# Patient Record
Sex: MALE | Race: OTHER | NOT HISPANIC OR LATINO | Employment: UNEMPLOYED | URBAN - METROPOLITAN AREA
[De-identification: names, ages, dates, MRNs, and addresses within clinical notes are randomized per-mention and may not be internally consistent; named-entity substitution may affect disease eponyms.]

---

## 2023-08-01 ENCOUNTER — HOSPITAL ENCOUNTER (INPATIENT)
Facility: HOSPITAL | Age: 68
LOS: 3 days | Discharge: HOME | DRG: 194 | End: 2023-08-05
Attending: EMERGENCY MEDICINE | Admitting: INTERNAL MEDICINE
Payer: COMMERCIAL

## 2023-08-01 ENCOUNTER — APPOINTMENT (EMERGENCY)
Dept: CT IMAGING | Facility: HOSPITAL | Age: 68
DRG: 194 | End: 2023-08-01
Payer: COMMERCIAL

## 2023-08-01 DIAGNOSIS — I50.43 ACUTE ON CHRONIC COMBINED SYSTOLIC AND DIASTOLIC CONGESTIVE HEART FAILURE (HCC): ICD-10-CM

## 2023-08-01 DIAGNOSIS — R09.02 HYPOXIA: ICD-10-CM

## 2023-08-01 DIAGNOSIS — I50.9 CHF EXACERBATION (HCC): Primary | ICD-10-CM

## 2023-08-01 DIAGNOSIS — J90 PLEURAL EFFUSION: ICD-10-CM

## 2023-08-01 DIAGNOSIS — I48.92 ATRIAL FLUTTER (HCC): ICD-10-CM

## 2023-08-01 PROBLEM — R06.02 SOB (SHORTNESS OF BREATH): Status: ACTIVE | Noted: 2020-08-20

## 2023-08-01 PROBLEM — I47.29 NSVT (NONSUSTAINED VENTRICULAR TACHYCARDIA) (HCC): Status: ACTIVE | Noted: 2020-08-21

## 2023-08-01 PROBLEM — I50.20 HFREF (HEART FAILURE WITH REDUCED EJECTION FRACTION) (HCC): Status: ACTIVE | Noted: 2021-07-16

## 2023-08-01 PROBLEM — I50.22 CHRONIC SYSTOLIC CHF (CONGESTIVE HEART FAILURE) (HCC): Status: ACTIVE | Noted: 2023-06-07

## 2023-08-01 PROBLEM — Z59.89 UNINSURED: Status: ACTIVE | Noted: 2020-09-09

## 2023-08-01 PROBLEM — R05.9 COUGH: Status: ACTIVE | Noted: 2020-10-19

## 2023-08-01 PROBLEM — I25.5 CARDIOMYOPATHY, ISCHEMIC: Status: ACTIVE | Noted: 2023-06-07

## 2023-08-01 LAB
2HR DELTA HS TROPONIN: 0 NG/L
ALBUMIN SERPL BCP-MCNC: 3.7 G/DL (ref 3.5–5)
ALP SERPL-CCNC: 83 U/L (ref 34–104)
ALT SERPL W P-5'-P-CCNC: 31 U/L (ref 7–52)
ANION GAP SERPL CALCULATED.3IONS-SCNC: 6 MMOL/L
APTT PPP: 51 SECONDS (ref 23–37)
AST SERPL W P-5'-P-CCNC: 37 U/L (ref 13–39)
ATRIAL RATE: 337 BPM
ATRIAL RATE: 381 BPM
BASOPHILS # BLD AUTO: 0.08 THOUSANDS/ÂΜL (ref 0–0.1)
BASOPHILS NFR BLD AUTO: 1 % (ref 0–1)
BILIRUB SERPL-MCNC: 1.24 MG/DL (ref 0.2–1)
BNP SERPL-MCNC: 1244 PG/ML (ref 0–100)
BUN SERPL-MCNC: 24 MG/DL (ref 5–25)
CALCIUM SERPL-MCNC: 8.7 MG/DL (ref 8.4–10.2)
CARDIAC TROPONIN I PNL SERPL HS: 25 NG/L
CARDIAC TROPONIN I PNL SERPL HS: 25 NG/L
CHLORIDE SERPL-SCNC: 103 MMOL/L (ref 96–108)
CO2 SERPL-SCNC: 26 MMOL/L (ref 21–32)
CREAT SERPL-MCNC: 1.47 MG/DL (ref 0.6–1.3)
EOSINOPHIL # BLD AUTO: 0.12 THOUSAND/ÂΜL (ref 0–0.61)
EOSINOPHIL NFR BLD AUTO: 2 % (ref 0–6)
ERYTHROCYTE [DISTWIDTH] IN BLOOD BY AUTOMATED COUNT: 14.6 % (ref 11.6–15.1)
GFR SERPL CREATININE-BSD FRML MDRD: 48 ML/MIN/1.73SQ M
GLUCOSE SERPL-MCNC: 140 MG/DL (ref 65–140)
HCT VFR BLD AUTO: 42.3 % (ref 36.5–49.3)
HGB BLD-MCNC: 13 G/DL (ref 12–17)
IMM GRANULOCYTES # BLD AUTO: 0.01 THOUSAND/UL (ref 0–0.2)
IMM GRANULOCYTES NFR BLD AUTO: 0 % (ref 0–2)
INR PPP: 1.19 (ref 0.84–1.19)
LIPASE SERPL-CCNC: 22 U/L (ref 11–82)
LYMPHOCYTES # BLD AUTO: 2.58 THOUSANDS/ÂΜL (ref 0.6–4.47)
LYMPHOCYTES NFR BLD AUTO: 40 % (ref 14–44)
MCH RBC QN AUTO: 29.6 PG (ref 26.8–34.3)
MCHC RBC AUTO-ENTMCNC: 30.7 G/DL (ref 31.4–37.4)
MCV RBC AUTO: 96 FL (ref 82–98)
MONOCYTES # BLD AUTO: 1 THOUSAND/ÂΜL (ref 0.17–1.22)
MONOCYTES NFR BLD AUTO: 15 % (ref 4–12)
NEUTROPHILS # BLD AUTO: 2.71 THOUSANDS/ÂΜL (ref 1.85–7.62)
NEUTS SEG NFR BLD AUTO: 42 % (ref 43–75)
NRBC BLD AUTO-RTO: 0 /100 WBCS
PLATELET # BLD AUTO: 166 THOUSANDS/UL (ref 149–390)
PMV BLD AUTO: 12.9 FL (ref 8.9–12.7)
POTASSIUM SERPL-SCNC: 4.2 MMOL/L (ref 3.5–5.3)
PROT SERPL-MCNC: 6.6 G/DL (ref 6.4–8.4)
PROTHROMBIN TIME: 15.1 SECONDS (ref 11.6–14.5)
QRS AXIS: 93 DEGREES
QRS AXIS: 98 DEGREES
QRSD INTERVAL: 110 MS
QRSD INTERVAL: 116 MS
QT INTERVAL: 296 MS
QT INTERVAL: 398 MS
QTC INTERVAL: 404 MS
QTC INTERVAL: 513 MS
RBC # BLD AUTO: 4.39 MILLION/UL (ref 3.88–5.62)
SODIUM SERPL-SCNC: 135 MMOL/L (ref 135–147)
T WAVE AXIS: -87 DEGREES
T WAVE AXIS: 268 DEGREES
TSH SERPL DL<=0.05 MIU/L-ACNC: 3.92 UIU/ML (ref 0.45–4.5)
VENTRICULAR RATE: 100 BPM
VENTRICULAR RATE: 112 BPM
WBC # BLD AUTO: 6.5 THOUSAND/UL (ref 4.31–10.16)

## 2023-08-01 PROCEDURE — 85730 THROMBOPLASTIN TIME PARTIAL: CPT | Performed by: PHYSICIAN ASSISTANT

## 2023-08-01 PROCEDURE — 93010 ELECTROCARDIOGRAM REPORT: CPT | Performed by: STUDENT IN AN ORGANIZED HEALTH CARE EDUCATION/TRAINING PROGRAM

## 2023-08-01 PROCEDURE — G1004 CDSM NDSC: HCPCS

## 2023-08-01 PROCEDURE — 84443 ASSAY THYROID STIM HORMONE: CPT | Performed by: PHYSICIAN ASSISTANT

## 2023-08-01 PROCEDURE — 71275 CT ANGIOGRAPHY CHEST: CPT

## 2023-08-01 PROCEDURE — 85610 PROTHROMBIN TIME: CPT | Performed by: PHYSICIAN ASSISTANT

## 2023-08-01 PROCEDURE — 99285 EMERGENCY DEPT VISIT HI MDM: CPT

## 2023-08-01 PROCEDURE — 74174 CTA ABD&PLVS W/CONTRAST: CPT

## 2023-08-01 PROCEDURE — 83690 ASSAY OF LIPASE: CPT | Performed by: PHYSICIAN ASSISTANT

## 2023-08-01 PROCEDURE — 36415 COLL VENOUS BLD VENIPUNCTURE: CPT | Performed by: PHYSICIAN ASSISTANT

## 2023-08-01 PROCEDURE — 99285 EMERGENCY DEPT VISIT HI MDM: CPT | Performed by: PHYSICIAN ASSISTANT

## 2023-08-01 PROCEDURE — 93005 ELECTROCARDIOGRAM TRACING: CPT

## 2023-08-01 PROCEDURE — 96374 THER/PROPH/DIAG INJ IV PUSH: CPT

## 2023-08-01 PROCEDURE — 85025 COMPLETE CBC W/AUTO DIFF WBC: CPT | Performed by: PHYSICIAN ASSISTANT

## 2023-08-01 PROCEDURE — 84484 ASSAY OF TROPONIN QUANT: CPT | Performed by: PHYSICIAN ASSISTANT

## 2023-08-01 PROCEDURE — 96375 TX/PRO/DX INJ NEW DRUG ADDON: CPT

## 2023-08-01 PROCEDURE — 80053 COMPREHEN METABOLIC PANEL: CPT | Performed by: PHYSICIAN ASSISTANT

## 2023-08-01 PROCEDURE — 83880 ASSAY OF NATRIURETIC PEPTIDE: CPT | Performed by: PHYSICIAN ASSISTANT

## 2023-08-01 RX ORDER — METOPROLOL TARTRATE 5 MG/5ML
5 INJECTION INTRAVENOUS ONCE
Status: COMPLETED | OUTPATIENT
Start: 2023-08-01 | End: 2023-08-01

## 2023-08-01 RX ORDER — FUROSEMIDE 10 MG/ML
80 INJECTION INTRAMUSCULAR; INTRAVENOUS ONCE
Status: COMPLETED | OUTPATIENT
Start: 2023-08-01 | End: 2023-08-01

## 2023-08-01 RX ORDER — LOSARTAN POTASSIUM 50 MG/1
50 TABLET ORAL DAILY
COMMUNITY
Start: 2023-07-25 | End: 2023-08-05

## 2023-08-01 RX ORDER — ASPIRIN 81 MG/1
81 TABLET ORAL DAILY
COMMUNITY
Start: 2023-03-15

## 2023-08-01 RX ORDER — MORPHINE SULFATE 4 MG/ML
4 INJECTION, SOLUTION INTRAMUSCULAR; INTRAVENOUS ONCE
Status: COMPLETED | OUTPATIENT
Start: 2023-08-01 | End: 2023-08-01

## 2023-08-01 RX ORDER — FUROSEMIDE 40 MG/1
40 TABLET ORAL DAILY
COMMUNITY
Start: 2023-03-15 | End: 2024-03-14

## 2023-08-01 RX ORDER — ATORVASTATIN CALCIUM 40 MG/1
40 TABLET, FILM COATED ORAL DAILY
COMMUNITY
Start: 2023-03-15 | End: 2024-03-14

## 2023-08-01 RX ORDER — METOPROLOL SUCCINATE 50 MG/1
50 TABLET, EXTENDED RELEASE ORAL 2 TIMES DAILY
COMMUNITY
Start: 2023-03-30 | End: 2024-03-24

## 2023-08-01 RX ORDER — FENTANYL CITRATE 50 UG/ML
1 INJECTION, SOLUTION INTRAMUSCULAR; INTRAVENOUS ONCE
Status: COMPLETED | OUTPATIENT
Start: 2023-08-01 | End: 2023-08-01

## 2023-08-01 RX ADMIN — METOROPROLOL TARTRATE 5 MG: 5 INJECTION, SOLUTION INTRAVENOUS at 20:32

## 2023-08-01 RX ADMIN — FUROSEMIDE 80 MG: 10 INJECTION, SOLUTION INTRAVENOUS at 22:30

## 2023-08-01 RX ADMIN — IOHEXOL 100 ML: 350 INJECTION, SOLUTION INTRAVENOUS at 21:50

## 2023-08-01 RX ADMIN — MORPHINE SULFATE 4 MG: 4 INJECTION INTRAVENOUS at 20:31

## 2023-08-02 ENCOUNTER — APPOINTMENT (INPATIENT)
Dept: NON INVASIVE DIAGNOSTICS | Facility: HOSPITAL | Age: 68
DRG: 194 | End: 2023-08-02
Payer: COMMERCIAL

## 2023-08-02 PROBLEM — R05.9 COUGH: Status: RESOLVED | Noted: 2020-10-19 | Resolved: 2023-08-02

## 2023-08-02 PROBLEM — R07.9 CHEST PAIN: Status: ACTIVE | Noted: 2023-08-02

## 2023-08-02 PROBLEM — R06.02 SOB (SHORTNESS OF BREATH): Status: RESOLVED | Noted: 2020-08-20 | Resolved: 2023-08-02

## 2023-08-02 PROBLEM — I47.29 NSVT (NONSUSTAINED VENTRICULAR TACHYCARDIA) (HCC): Status: RESOLVED | Noted: 2020-08-21 | Resolved: 2023-08-02

## 2023-08-02 PROBLEM — I50.20 HFREF (HEART FAILURE WITH REDUCED EJECTION FRACTION) (HCC): Status: RESOLVED | Noted: 2021-07-16 | Resolved: 2023-08-02

## 2023-08-02 PROBLEM — R73.03 PREDIABETES: Status: ACTIVE | Noted: 2023-08-02

## 2023-08-02 PROBLEM — J90 PLEURAL EFFUSION, BILATERAL: Status: ACTIVE | Noted: 2023-08-02

## 2023-08-02 PROBLEM — I50.22 CHRONIC SYSTOLIC CHF (CONGESTIVE HEART FAILURE) (HCC): Status: RESOLVED | Noted: 2023-06-07 | Resolved: 2023-08-02

## 2023-08-02 LAB
ANION GAP SERPL CALCULATED.3IONS-SCNC: 8 MMOL/L
AORTIC ROOT: 2.1 CM
AORTIC VALVE MEAN VELOCITY: 21.7 M/S
APICAL FOUR CHAMBER EJECTION FRACTION: 27 %
AV AREA BY CONTINUOUS VTI: 1 CM2
AV AREA PEAK VELOCITY: 1.2 CM2
AV LVOT MEAN GRADIENT: 2 MMHG
AV LVOT PEAK GRADIENT: 3 MMHG
AV MEAN GRADIENT: 21 MMHG
AV PEAK GRADIENT: 36 MMHG
AV REGURGITATION PRESSURE HALF TIME: 687 MS
AV VALVE AREA: 1.05 CM2
AV VELOCITY RATIO: 0.31
BASOPHILS # BLD AUTO: 0.06 THOUSANDS/ÂΜL (ref 0–0.1)
BASOPHILS NFR BLD AUTO: 1 % (ref 0–1)
BUN SERPL-MCNC: 25 MG/DL (ref 5–25)
CALCIUM SERPL-MCNC: 9.1 MG/DL (ref 8.4–10.2)
CHLORIDE SERPL-SCNC: 102 MMOL/L (ref 96–108)
CHOLEST SERPL-MCNC: 94 MG/DL
CO2 SERPL-SCNC: 29 MMOL/L (ref 21–32)
CREAT SERPL-MCNC: 1.4 MG/DL (ref 0.6–1.3)
DOP CALC AO PEAK VEL: 2.98 M/S
DOP CALC AO VTI: 59.43 CM
DOP CALC LVOT AREA: 3.8 CM2
DOP CALC LVOT CARDIAC INDEX: 2.41 L/MIN/M2
DOP CALC LVOT CARDIAC OUTPUT: 4.17 L/MIN
DOP CALC LVOT DIAMETER: 2.2 CM
DOP CALC LVOT PEAK VEL VTI: 16.39 CM
DOP CALC LVOT PEAK VEL: 0.93 M/S
DOP CALC LVOT STROKE VOLUME: 62.27 CM3
E WAVE DECELERATION TIME: 126 MS
EOSINOPHIL # BLD AUTO: 0.02 THOUSAND/ÂΜL (ref 0–0.61)
EOSINOPHIL NFR BLD AUTO: 0 % (ref 0–6)
ERYTHROCYTE [DISTWIDTH] IN BLOOD BY AUTOMATED COUNT: 14.5 % (ref 11.6–15.1)
FRACTIONAL SHORTENING: 13 % (ref 28–44)
GFR SERPL CREATININE-BSD FRML MDRD: 51 ML/MIN/1.73SQ M
GLUCOSE P FAST SERPL-MCNC: 122 MG/DL (ref 65–99)
GLUCOSE SERPL-MCNC: 122 MG/DL (ref 65–140)
HCT VFR BLD AUTO: 41.9 % (ref 36.5–49.3)
HDLC SERPL-MCNC: 27 MG/DL
HGB BLD-MCNC: 13.1 G/DL (ref 12–17)
IMM GRANULOCYTES # BLD AUTO: 0.02 THOUSAND/UL (ref 0–0.2)
IMM GRANULOCYTES NFR BLD AUTO: 0 % (ref 0–2)
INTERVENTRICULAR SEPTUM IN DIASTOLE (PARASTERNAL SHORT AXIS VIEW): 1.3 CM
INTERVENTRICULAR SEPTUM: 1.3 CM (ref 0.6–1.1)
LAAS-AP2: 27.9 CM2
LAAS-AP4: 27.4 CM2
LDLC SERPL CALC-MCNC: 56 MG/DL (ref 0–100)
LEFT ATRIUM AREA SYSTOLE SINGLE PLANE A4C: 27.7 CM2
LEFT ATRIUM SIZE: 5.1 CM
LEFT ATRIUM VOLUME (MOD BIPLANE): 95 ML
LEFT INTERNAL DIMENSION IN SYSTOLE: 5.2 CM (ref 2.1–4)
LEFT VENTRICULAR INTERNAL DIMENSION IN DIASTOLE: 6 CM (ref 3.5–6)
LEFT VENTRICULAR POSTERIOR WALL IN END DIASTOLE: 1.3 CM
LEFT VENTRICULAR STROKE VOLUME: 55 ML
LVSV (TEICH): 55 ML
LYMPHOCYTES # BLD AUTO: 0.86 THOUSANDS/ÂΜL (ref 0.6–4.47)
LYMPHOCYTES NFR BLD AUTO: 16 % (ref 14–44)
MAGNESIUM SERPL-MCNC: 2.5 MG/DL (ref 1.9–2.7)
MCH RBC QN AUTO: 29.7 PG (ref 26.8–34.3)
MCHC RBC AUTO-ENTMCNC: 31.3 G/DL (ref 31.4–37.4)
MCV RBC AUTO: 95 FL (ref 82–98)
MITRAL REGURGITATION PEAK VELOCITY: 5.03 M/S
MITRAL VALVE MEAN INFLOW VELOCITY: 4.11 M/S
MITRAL VALVE REGURGITANT PEAK GRADIENT: 101 MMHG
MONOCYTES # BLD AUTO: 0.66 THOUSAND/ÂΜL (ref 0.17–1.22)
MONOCYTES NFR BLD AUTO: 12 % (ref 4–12)
MV E'TISSUE VEL-SEP: 4 CM/S
MV PEAK A VEL: 0.46 M/S
MV PEAK E VEL: 122 CM/S
MV STENOSIS PRESSURE HALF TIME: 37 MS
MV VALVE AREA P 1/2 METHOD: 5.95 CM2
NEUTROPHILS # BLD AUTO: 3.92 THOUSANDS/ÂΜL (ref 1.85–7.62)
NEUTS SEG NFR BLD AUTO: 71 % (ref 43–75)
NRBC BLD AUTO-RTO: 0 /100 WBCS
PLATELET # BLD AUTO: 149 THOUSANDS/UL (ref 149–390)
PMV BLD AUTO: 12.8 FL (ref 8.9–12.7)
POTASSIUM SERPL-SCNC: 4.4 MMOL/L (ref 3.5–5.3)
RA PRESSURE ESTIMATED: 15 MMHG
RBC # BLD AUTO: 4.41 MILLION/UL (ref 3.88–5.62)
RIGHT ATRIUM AREA SYSTOLE A4C: 18.6 CM2
RIGHT VENTRICLE ID DIMENSION: 4 CM
RV PSP: 64 MMHG
SL CV AV DECELERATION TIME RETROGRADE: 2370 MS
SL CV AV PEAK GRADIENT RETROGRADE: 69 MMHG
SL CV DOP CALC MV VTI RETROGRADE: 162.9 CM
SL CV LEFT ATRIUM LENGTH A2C: 6.7 CM
SL CV LV EF: 25
SL CV MV MEAN GRADIENT RETROGRADE: 72 MMHG
SL CV PED ECHO LEFT VENTRICLE DIASTOLIC VOLUME (MOD BIPLANE) 2D: 183 ML
SL CV PED ECHO LEFT VENTRICLE SYSTOLIC VOLUME (MOD BIPLANE) 2D: 128 ML
SODIUM SERPL-SCNC: 139 MMOL/L (ref 135–147)
TR MAX PG: 49 MMHG
TR PEAK VELOCITY: 3.5 M/S
TRICUSPID ANNULAR PLANE SYSTOLIC EXCURSION: 1.6 CM
TRICUSPID VALVE PEAK REGURGITATION VELOCITY: 3.5 M/S
TRIGL SERPL-MCNC: 54 MG/DL
WBC # BLD AUTO: 5.54 THOUSAND/UL (ref 4.31–10.16)

## 2023-08-02 PROCEDURE — 80048 BASIC METABOLIC PNL TOTAL CA: CPT

## 2023-08-02 PROCEDURE — 83735 ASSAY OF MAGNESIUM: CPT

## 2023-08-02 PROCEDURE — 93306 TTE W/DOPPLER COMPLETE: CPT

## 2023-08-02 PROCEDURE — 80061 LIPID PANEL: CPT

## 2023-08-02 PROCEDURE — 99223 1ST HOSP IP/OBS HIGH 75: CPT

## 2023-08-02 PROCEDURE — 85025 COMPLETE CBC W/AUTO DIFF WBC: CPT

## 2023-08-02 RX ORDER — FUROSEMIDE 10 MG/ML
50 INJECTION INTRAMUSCULAR; INTRAVENOUS 2 TIMES DAILY
Status: DISCONTINUED | OUTPATIENT
Start: 2023-08-02 | End: 2023-08-03

## 2023-08-02 RX ORDER — LOSARTAN POTASSIUM 50 MG/1
50 TABLET ORAL DAILY
Status: DISCONTINUED | OUTPATIENT
Start: 2023-08-02 | End: 2023-08-02

## 2023-08-02 RX ORDER — ATORVASTATIN CALCIUM 40 MG/1
40 TABLET, FILM COATED ORAL
Status: DISCONTINUED | OUTPATIENT
Start: 2023-08-02 | End: 2023-08-05 | Stop reason: HOSPADM

## 2023-08-02 RX ORDER — ACETAMINOPHEN 325 MG/1
650 TABLET ORAL EVERY 4 HOURS PRN
Status: DISCONTINUED | OUTPATIENT
Start: 2023-08-02 | End: 2023-08-05 | Stop reason: HOSPADM

## 2023-08-02 RX ORDER — MAGNESIUM HYDROXIDE/ALUMINUM HYDROXICE/SIMETHICONE 120; 1200; 1200 MG/30ML; MG/30ML; MG/30ML
30 SUSPENSION ORAL EVERY 6 HOURS PRN
Status: DISCONTINUED | OUTPATIENT
Start: 2023-08-02 | End: 2023-08-05 | Stop reason: HOSPADM

## 2023-08-02 RX ORDER — POLYETHYLENE GLYCOL 3350 17 G/17G
17 POWDER, FOR SOLUTION ORAL DAILY
Status: DISCONTINUED | OUTPATIENT
Start: 2023-08-02 | End: 2023-08-05 | Stop reason: HOSPADM

## 2023-08-02 RX ORDER — LOSARTAN POTASSIUM 25 MG/1
25 TABLET ORAL DAILY
Status: DISCONTINUED | OUTPATIENT
Start: 2023-08-03 | End: 2023-08-03

## 2023-08-02 RX ORDER — METOPROLOL SUCCINATE 50 MG/1
50 TABLET, EXTENDED RELEASE ORAL 2 TIMES DAILY
Status: DISCONTINUED | OUTPATIENT
Start: 2023-08-02 | End: 2023-08-03

## 2023-08-02 RX ORDER — ONDANSETRON 2 MG/ML
4 INJECTION INTRAMUSCULAR; INTRAVENOUS EVERY 6 HOURS PRN
Status: DISCONTINUED | OUTPATIENT
Start: 2023-08-02 | End: 2023-08-02

## 2023-08-02 RX ORDER — ONDANSETRON 2 MG/ML
4 INJECTION INTRAMUSCULAR; INTRAVENOUS EVERY 4 HOURS PRN
Status: DISCONTINUED | OUTPATIENT
Start: 2023-08-02 | End: 2023-08-05 | Stop reason: HOSPADM

## 2023-08-02 RX ADMIN — ASPIRIN 81 MG: 81 TABLET, COATED ORAL at 09:35

## 2023-08-02 RX ADMIN — APIXABAN 5 MG: 5 TABLET, FILM COATED ORAL at 17:29

## 2023-08-02 RX ADMIN — FUROSEMIDE 50 MG: 10 INJECTION, SOLUTION INTRAVENOUS at 09:33

## 2023-08-02 RX ADMIN — APIXABAN 5 MG: 5 TABLET, FILM COATED ORAL at 09:35

## 2023-08-02 RX ADMIN — POLYETHYLENE GLYCOL 3350 17 G: 17 POWDER, FOR SOLUTION ORAL at 09:37

## 2023-08-02 RX ADMIN — METOPROLOL SUCCINATE 50 MG: 50 TABLET, EXTENDED RELEASE ORAL at 09:40

## 2023-08-02 RX ADMIN — ATORVASTATIN CALCIUM 40 MG: 80 TABLET, FILM COATED ORAL at 17:29

## 2023-08-02 RX ADMIN — LOSARTAN POTASSIUM 50 MG: 50 TABLET, FILM COATED ORAL at 09:35

## 2023-08-02 RX ADMIN — METOPROLOL SUCCINATE 50 MG: 50 TABLET, EXTENDED RELEASE ORAL at 00:47

## 2023-08-02 NOTE — ASSESSMENT & PLAN NOTE
· Patient with a history of moderate aortic stenosis on prior echocardiograms  · Repeat echocardiogram pending

## 2023-08-02 NOTE — ASSESSMENT & PLAN NOTE
Case management consultation: Patient's [facility] cardiologist is coordinating with [facility] population health department to assist patient with obtaining insurance as he currently does not have a Social Security card, or access to insurance

## 2023-08-02 NOTE — PLAN OF CARE
Problem: PAIN - ADULT  Goal: Verbalizes/displays adequate comfort level or baseline comfort level  Description: Interventions:  - Encourage patient to monitor pain and request assistance  - Assess pain using appropriate pain scale  - Administer analgesics based on type and severity of pain and evaluate response  - Implement non-pharmacological measures as appropriate and evaluate response  - Consider cultural and social influences on pain and pain management  - Notify physician/advanced practitioner if interventions unsuccessful or patient reports new pain  Outcome: Progressing     Problem: INFECTION - ADULT  Goal: Absence or prevention of progression during hospitalization  Description: INTERVENTIONS:  - Assess and monitor for signs and symptoms of infection  - Monitor lab/diagnostic results  - Monitor all insertion sites, i.e. indwelling lines, tubes, and drains  - Monitor endotracheal if appropriate and nasal secretions for changes in amount and color  - [place] appropriate cooling/warming therapies per order  - Administer medications as ordered  - Instruct and encourage patient and family to use good hand hygiene technique  - Identify and instruct in appropriate isolation precautions for identified infection/condition  Outcome: Progressing  Goal: Absence of fever/infection during neutropenic period  Description: INTERVENTIONS:  - Monitor WBC    Outcome: Progressing

## 2023-08-02 NOTE — ASSESSMENT & PLAN NOTE
• Patient with moderate right and small left pleural effusions, in the setting of interstitial edema, most likely secondary to CHF  • Monitor with diuresis  • No current respiratory distress or indication for urgent thoracentesis

## 2023-08-02 NOTE — ASSESSMENT & PLAN NOTE
• Patient has a history of coronary artery disease status post CABG in 2008  • Catheterization in 2020 with patent LIMA-LAD, and mild disease of SVG-OM.   Native left circumflex with diffuse disease, RCA with chronic total occlusion however collateral flow  • Continue home aspirin, beta-blocker, statin, ARB

## 2023-08-02 NOTE — PLAN OF CARE
Problem: PAIN - ADULT  Goal: Verbalizes/displays adequate comfort level or baseline comfort level  Description: Interventions:  - Encourage patient to monitor pain and request assistance  - Assess pain using appropriate pain scale  - Administer analgesics based on type and severity of pain and evaluate response  - Implement non-pharmacological measures as appropriate and evaluate response  - Consider cultural and social influences on pain and pain management  - Notify physician/advanced practitioner if interventions unsuccessful or patient reports new pain  Outcome: Progressing     Problem: INFECTION - ADULT  Goal: Absence or prevention of progression during hospitalization  Description: INTERVENTIONS:  - Assess and monitor for signs and symptoms of infection  - Monitor lab/diagnostic results  - Monitor all insertion sites, i.e. indwelling lines, tubes, and drains  - Monitor endotracheal if appropriate and nasal secretions for changes in amount and color  - [place] appropriate cooling/warming therapies per order  - Administer medications as ordered  - Instruct and encourage patient and family to use good hand hygiene technique  - Identify and instruct in appropriate isolation precautions for identified infection/condition  Outcome: Progressing  Goal: Absence of fever/infection during neutropenic period  Description: INTERVENTIONS:  - Monitor WBC    Outcome: Progressing

## 2023-08-02 NOTE — UTILIZATION REVIEW
Initial Clinical Review    OBSERVATION 8/1 CHANGED TO INPATIENT ON 8/2 @ 0733 - IV DIURESIS FOR ACUTE ON CHRONIC CHF    Admission: Date/Time/Statement:   Admission Orders (From admission, onward)     Ordered        08/02/23 0733  Inpatient Admission  Once                      Orders Placed This Encounter   Procedures   • Inpatient Admission     Standing Status:   Standing     Number of Occurrences:   1     Order Specific Question:   Level of Care     Answer:   Med Surg [16]     Order Specific Question:   Estimated length of stay     Answer:   More than 2 Midnights     Order Specific Question:   Certification     Answer:   I certify that inpatient services are medically necessary for this patient for a duration of greater than two midnights. See H&P and MD Progress Notes for additional information about the patient's course of treatment. ED Arrival Information     Expected   -    Arrival   8/1/2023 19:55    Acuity   Emergent            Means of arrival   Ambulance    Escorted by   [address])    Service   Hospitalist    Admission type   Emergency            Arrival complaint   Abdominal pain           Chief Complaint   Patient presents with   • Epigastric Pain     Pt reports sudden onset of sharp epigastric pain that radiates into chest starting PTA, pt reports using BR and felt like couldn't get up after, 50mcg fentanyl given by EMS with some relief       Initial Presentation: 75 y.o. male presents to the ED via EMS from home with c/o sudden onset epigastric chest pain with radiation to LLQ, worsening SOB w/ exertion, cough. Legs gave out and he fell to ground, no head strike, was unable to get up by himself. EMS treated with Fentanyl. Recently taken off Lisinopril and put on Losartan. PMH:  ischemic cardiomyopathy, HFrEF, A flutter on Eliquis, CKD3, CABG. In the ED pt was tachycardic. Labs - elevated BNP, creat, negative troponins. Imaging - pulm edema, cardiomegaly.   ECG shows A flutter w/ RVR. Treated with IV Morphine, IV Lopressor, IV Lasix 80 mg. On exam diminished breath sounds bilat lower lobes. He is admitted to OBSERVATION status with acute on chronic comb CHF  - IV Lasix 50 mg BID, continue Metoprolol, Losartan, TTE, daily wt, low NA diet, lytes mgmt, oxygen. Bilat pleural effusions - repeat CXR post diuresis. A flutter - Eliquis. Date: 8/2   Day 2: CHANGED TO INPATIENT  Creat down to 1.40. Pt remains on IV Lasix BID for CHF exacerbation. Pt remains on oxygen today. HR WNL.       ED Triage Vitals   Temperature Pulse Respirations Blood Pressure SpO2   08/01/23 2045 08/01/23 1958 08/01/23 1958 08/01/23 1959 08/01/23 1959   98 °F (36.7 °C) (!) 122 21 136/95 99 %      Temp Source Heart Rate Source Patient Position - Orthostatic VS BP Location FiO2 (%)   08/02/23 0017 08/01/23 1958 08/01/23 2000 08/01/23 2000 --   Temporal Monitor Lying Right arm       Pain Score       08/01/23 2031       10 - Worst Possible Pain          Wt Readings from Last 1 Encounters:   08/02/23 72 kg (158 lb 11.7 oz)     Additional Vital Signs:   08/02/23 0750 95.6 °F (35.3 °C) Abnormal  94 20 113/93 98 96 % -- -- None (Room air) Sitting   08/02/23 0312 97.1 °F (36.2 °C) Abnormal  86 20 115/79 91 100 % 28 2 L/min Nasal cannula Lying   08/02/23 0017 97.3 °F (36.3 °C) Abnormal  89 20 143/100 113 100 % 28 2 L/min Nasal cannula Sitting   08/01/23 2345 -- 108 Abnormal  20 134/93 107 98 % 28 2 L/min Nasal cannula Lying   08/01/23 2330 -- 87 20 109/81 91 97 % 28 2 L/min Nasal cannula Lying   08/01/23 2300 -- 87 20 119/90 101 98 % 28 2 L/min Nasal cannula Lying   08/01/23 2245 -- 90 20 112/83 94 97 % 28 2 L/min Nasal cannula Lying   08/01/23 2230 -- 91 20 116/85 97 96 % 28 2 L/min Nasal cannula Lying   08/01/23 2130 -- 85 20 111/78 90 96 % 28 2 L/min Nasal cannula Lying   08/01/23 2100 -- 92 20 115/84 97 96 % 28 2 L/min Nasal cannula Lying   08/01/23 2045 98 °F (36.7 °C) 98 20 129/86 102 81 % Abnormal  -- -- None (Room air) Lying   08/01/23 2030 -- 109 Abnormal  22 133/76 95 99 % -- -- None (Room air) Lying   08/01/23 2024 -- -- -- -- -- -- -- -- None (Room air) --   08/01/23 2000 -- 119 Abnormal  22 136/95 109 99 % -- -- None (Room air) Lying     Pertinent Labs/Diagnostic Test Results:     8/1 ECG - Atrial flutter with variable A-V block  Rightward axis  ST & T wave abnormality, consider inferior ischemia  Abnormal ECG  8/1 ECG - Atrial flutter with variable A-V block  Rightward axis  ST & T wave abnormality, consider inferior ischemia  Prolonged QT  Abnormal ECG    8/2 Echo - P     CTA dissection protocol chest/abdomen/pelvis   Final Result by Shahida Kinney DO (08/01 2251)      No aortic aneurysm or dissection. Moderate right and small left pleural effusions with interlobular septal thickening and scattered subtle groundglass opacities. Findings are suggestive of developing pulmonary edema. Cardiomegaly         The study was marked in EPIC for immediate notification.       Workstation performed: [id number]               Results from last 7 days   Lab Units 08/02/23 0427 08/01/23 2038   WBC Thousand/uL 5.54 6.50   HEMOGLOBIN g/dL 13.1 13.0   HEMATOCRIT % 41.9 42.3   PLATELETS Thousands/uL 149 166   NEUTROS ABS Thousands/µL 3.92 2.71         Results from last 7 days   Lab Units 08/02/23 0427 08/01/23 2038   SODIUM mmol/L 139 135   POTASSIUM mmol/L 4.4 4.2   CHLORIDE mmol/L 102 103   CO2 mmol/L 29 26   ANION GAP mmol/L 8 6   BUN mg/dL 25 24   CREATININE mg/dL 1.40* 1.47*   EGFR ml/min/1.73sq m 51 48   CALCIUM mg/dL 9.1 8.7   MAGNESIUM mg/dL 2.5  --      Results from last 7 days   Lab Units 08/01/23 2038   AST U/L 37   ALT U/L 31   ALK PHOS U/L 83   TOTAL PROTEIN g/dL 6.6   ALBUMIN g/dL 3.7   TOTAL BILIRUBIN mg/dL 1.24*         Results from last 7 days   Lab Units 08/02/23 0427 08/01/23 2038   GLUCOSE RANDOM mg/dL 122 140     Results from last 7 days   Lab Units 08/01/23  2230 08/01/23  2038   HS TNI 0HR ng/L  --  25   HS TNI 2HR ng/L 25  --    HSTNI D2 ng/L 0  --          Results from last 7 days   Lab Units 08/01/23 2038   PROTIME seconds 15.1*   INR  1.19   PTT seconds 51*     Results from last 7 days   Lab Units 08/01/23 2038   TSH 3RD GENERATON uIU/mL 3.915     Results from last 7 days   Lab Units 08/01/23 2038   BNP pg/mL 1,244*     Results from last 7 days   Lab Units 08/01/23 2038   LIPASE u/L 22         ED Treatment:   Medication Administration from 08/01/2023 1955 to 08/02/2023 0007       Date/Time Order Dose Route Action     08/01/2023 2031 EDT morphine injection 4 mg 4 mg Intravenous Given     08/01/2023 2032 EDT metoprolol (LOPRESSOR) injection 5 mg 5 mg Intravenous Given     08/01/2023 2150 EDT iohexol (OMNIPAQUE) 350 MG/ML injection (SINGLE-DOSE) 100 mL 100 mL Intravenous Given     08/01/2023 2230 EDT furosemide (LASIX) injection 80 mg 80 mg Intravenous Given        Present on Admission:  • Acute on chronic combined systolic and diastolic congestive heart failure (HCC)  • Cardiomyopathy, ischemic  • Coronary artery disease involving native coronary artery of native heart without angina pectoris  • Typical atrial flutter (HCC)  • Nonrheumatic aortic valve insufficiency  • CKD (chronic kidney disease), stage III ([facility])      Admitting Diagnosis: Atrial flutter (HCC) [I48.92]  Epigastric pain [R10.13]  Pleural effusion [J90]  Hypoxia [R09.02]  CHF exacerbation (HCC) [I50.9]  Age/Sex: 75 y.o. male  Admission Orders:  Scheduled Medications:  apixaban, 5 mg, Oral, BID  aspirin, 81 mg, Oral, Daily  atorvastatin, 40 mg, Oral, Daily With Dinner  furosemide, 50 mg, Intravenous, BID  losartan, 50 mg, Oral, Daily  metoprolol succinate, 50 mg, Oral, BID  polyethylene glycol, 17 g, Oral, Daily      Continuous IV Infusions:     PRN Meds:  acetaminophen, 650 mg, Oral, Q4H PRN  aluminum-magnesium hydroxide-simethicone, 30 mL, Oral, Q6H PRN  oxyCODONE, 2.5 mg, Oral, Q4H PRN    Tele  Daily wt  Fluid restriction  Echo  IP CONSULT TO NUTRITION SERVICES  IP CONSULT TO CASE MANAGEMENT    Network Utilization Review Department  ATTENTION: Please call with any questions or concerns to [phone] and carefully listen to the prompts so that you are directed to the right person. All voicemails are confidential.  Adia Morales all requests for admission clinical reviews, approved or denied determinations and any other requests to dedicated fax number below belonging to the campus where the patient is receiving treatment.  List of dedicated fax numbers for the Facilities:  [place] DENIALS (Administrative/Medical Necessity) [phone] [address] [facility] (Maternity/NICU/Pediatrics) [phone]   [address] [place] [address] Drive [phone]   [facility] [address] [facility] [phone]   [address] [place] [address] Road [address] [place] [address] [phone]   [address] [facility] [address] [phone]

## 2023-08-02 NOTE — ASSESSMENT & PLAN NOTE
Patient is a 58-year-old male with past medical history significant for coronary artery disease status post CABG, and ischemic cardiomyopathy with an ejection fraction in the 20s, as well as paroxysmal atrial fibrillation, not on anticoagulation, who presented with worsening dyspnea on exertion, orthopnea    · Patient has a history of chronic combined systolic and diastolic congestive heart failure and follows with [facility] cardiology: Diagnosed with ischemic cardiomyopathy  · Most recent 2D echocardiogram 08/2020: LVEF 20%, inferior/basal akinesis, severe RV dilation, MV thickening, severe AV thickening   · Patient had repeat 2D echocardiogram ordered as an outpatient several months ago, however as he only have insurance coverage, he was unable to pay out-of-pocket for this study  · Patient is prescribed Lasix 40 mg daily, and notes he is compliant with this medication  · Patient presented with volume overload, dyspnea on exertion and orthopnea, interstitial edema on CAT scan, and proBNP of 124  · Patient was diuresed with 80 mg IV Lasix in the ER and started on 50 mg IV twice daily  · No significant decrease in weight thus far, compared with admission weight (ER weight not felt to be accurate)  · Continue home metoprolol XL 50 mg twice daily and home losartan 50 mg daily (was recently changed by his outpatient cardiologist from lisinopril)  · Outpatient cardiology office records note he was not able to receive ICD due to lack of insurance coverage:  Their office is coordinating with [facility] to attempt to arrange insurance  · Repeat 2D echocardiogram performed today: Preliminary left ventricular ejection fraction 27%: Await official report

## 2023-08-02 NOTE — ED PROVIDER NOTES
History  Chief Complaint   Patient presents with   • Epigastric Pain     Pt reports sudden onset of sharp epigastric pain that radiates into chest starting PTA, pt reports using BR and felt like couldn't get up after, 50mcg fentanyl given by EMS with some relief     This is a 77-year-old male with past medical history of h/o ischemic cardiomyopathy, HFrEF, A flutter on Eliquis, CKD3, CABG presenting to the ED for evaluation of epigastric/chest pain. Patient states he had sudden onset epigastric pain that radiated to the chest and the left lower quadrant of the abdomen. Patient states that he was urinating at the time of onset. He states the pain made his leg give out and he fell to the ground. He denies any head strike. He states that he was unable to get himself up afterward. He endorses shortness of breath and pleuritic CP. He was given fentanyl via EMS with mild improvement of symptoms. He denies any LH or dizziness. He denies any nausea or vomiting. Pt did not take his medications this evening. History provided by:  Patient   used: No        Prior to Admission Medications   Prescriptions Last Dose Informant Patient Reported? Taking? apixaban (ELIQUIS) 5 mg   Yes Yes   Sig: Take 5 mg by mouth 2 (two) times a day   aspirin (ECOTRIN LOW STRENGTH) 81 mg EC tablet   Yes Yes   Sig: Take 81 mg by mouth daily   atorvastatin (LIPITOR) 40 mg tablet   Yes Yes   Sig: Take 40 mg by mouth daily   furosemide (LASIX) 40 mg tablet   Yes Yes   Sig: Take 40 mg by mouth daily   losartan (COZAAR) 50 mg tablet   Yes Yes   Sig: Take 50 mg by mouth daily   metoprolol succinate (TOPROL-XL) 50 mg 24 hr tablet   Yes Yes   Sig: Take 50 mg by mouth 2 (two) times a day      Facility-Administered Medications: None       History reviewed. No pertinent past medical history. Past Surgical History:   Procedure Laterality Date   • CORONARY ARTERY BYPASS GRAFT         History reviewed.  No pertinent family history. I have reviewed and agree with the history as documented. E-Cigarette/Vaping     E-Cigarette/Vaping Substances     Social History     Tobacco Use   • Smoking status: Never   • Smokeless tobacco: Never   Substance Use Topics   • Alcohol use: Never   • Drug use: Never       Review of Systems   Constitutional: Negative for chills, fatigue and fever. HENT: Negative for congestion, hearing loss, sore throat and trouble swallowing. Eyes: Negative for visual disturbance. Respiratory: Positive for shortness of breath. Negative for cough and chest tightness. Cardiovascular: Positive for chest pain and palpitations. Negative for leg swelling. Gastrointestinal: Positive for abdominal pain. Negative for constipation, diarrhea, nausea and vomiting. Musculoskeletal: Negative for back pain. Neurological: Negative for dizziness, weakness, light-headedness, numbness and headaches. All other systems reviewed and are negative. Physical Exam  Physical Exam  Vitals reviewed. Constitutional:       General: He is not in acute distress. Appearance: Normal appearance. He is well-developed and well-groomed. He is not ill-appearing, toxic-appearing or diaphoretic. HENT:      Head: Normocephalic and atraumatic. Right Ear: External ear normal.      Left Ear: External ear normal.      Nose: Nose normal. No congestion or rhinorrhea. Mouth/Throat:      Mouth: Mucous membranes are moist.      Pharynx: Oropharynx is clear. No oropharyngeal exudate or posterior oropharyngeal erythema. Eyes:      General: No scleral icterus. Right eye: No discharge. Left eye: No discharge. Extraocular Movements: Extraocular movements intact. Conjunctiva/sclera: Conjunctivae normal.   Cardiovascular:      Rate and Rhythm: Tachycardia present. Rhythm regularly irregular. Pulses: Normal pulses. Heart sounds: No murmur heard. No friction rub. No gallop.    Pulmonary:      Effort: No respiratory distress. Breath sounds: Normal breath sounds. No wheezing, rhonchi or rales. Chest:      Comments: Sternotomy scar  Abdominal:      General: Abdomen is flat. There is no distension. Palpations: Abdomen is soft. Tenderness: There is abdominal tenderness in the epigastric area. There is no right CVA tenderness, left CVA tenderness, guarding or rebound. Musculoskeletal:         General: No deformity. Normal range of motion. Cervical back: Normal range of motion and neck supple. Right lower leg: No edema. Left lower leg: No edema. Skin:     General: Skin is warm and dry. Coloration: Skin is not jaundiced or pale. Findings: No rash. Neurological:      General: No focal deficit present. Mental Status: He is alert. Psychiatric:         Mood and Affect: Mood normal.         Behavior: Behavior normal. Behavior is cooperative.          Vital Signs  ED Triage Vitals   Temperature Pulse Respirations Blood Pressure SpO2   08/01/23 2045 08/01/23 1958 08/01/23 1958 08/01/23 1959 08/01/23 1959   98 °F (36.7 °C) (!) 122 21 136/95 99 %      Temp Source Heart Rate Source Patient Position - Orthostatic VS BP Location FiO2 (%)   08/02/23 0017 08/01/23 1958 08/01/23 2000 08/01/23 2000 --   Temporal Monitor Lying Right arm       Pain Score       08/01/23 2031       10 - Worst Possible Pain           Vitals:    08/01/23 2300 08/01/23 2330 08/01/23 2345 08/02/23 0017   BP: 119/90 109/81 134/93 143/100   Pulse: 87 87 (!) 108 89   Patient Position - Orthostatic VS: Lying Lying Lying Sitting         Visual Acuity      ED Medications  Medications   fentanyl citrate (PF) (FOR EMS ONLY) 100 mcg/2 mL injection 100 mcg (0 mcg Does not apply Given to EMS 8/1/23 2001)   morphine injection 4 mg (4 mg Intravenous Given 8/1/23 2031)   metoprolol (LOPRESSOR) injection 5 mg (5 mg Intravenous Given 8/1/23 2032)   iohexol (OMNIPAQUE) 350 MG/ML injection (SINGLE-DOSE) 100 mL (100 mL Intravenous Given 8/1/23 2150)   furosemide (LASIX) injection 80 mg (80 mg Intravenous Given 8/1/23 2230)       Diagnostic Studies  Results Reviewed     Procedure Component Value Units Date/Time    HS Troponin I 2hr [[id number]]  (Normal) Collected: 08/01/23 2230    Lab Status: Final result Specimen: Blood from Line, Venous Updated: 08/01/23 2319     hs TnI 2hr 25 ng/L      Delta 2hr hsTnI 0 ng/L     B-Type Natriuretic Peptide(BNP) [[id number]]  (Abnormal) Collected: 08/01/23 2038    Lab Status: Final result Specimen: Blood from Arm, Left Updated: 08/01/23 2156     BNP 1,244 pg/mL     TSH, 3rd generation with Free T4 reflex [[id number]]  (Normal) Collected: 08/01/23 2038    Lab Status: Final result Specimen: Blood from Arm, Left Updated: 08/01/23 2142     TSH 3RD GENERATON 3.915 uIU/mL     HS Troponin 0hr (reflex protocol) [[id number]]  (Normal) Collected: 08/01/23 2038    Lab Status: Final result Specimen: Blood from Arm, Left Updated: 08/01/23 2133     hs TnI 0hr 25 ng/L     Protime-INR [[id number]]  (Abnormal) Collected: 08/01/23 2038    Lab Status: Final result Specimen: Blood from Arm, Left Updated: 08/01/23 2132     Protime 15.1 seconds      INR 1.19    APTT [[id number]]  (Abnormal) Collected: 08/01/23 2038    Lab Status: Final result Specimen: Blood from Arm, Left Updated: 08/01/23 2132     PTT 51 seconds     Comprehensive metabolic panel [[id number]]  (Abnormal) Collected: 08/01/23 2038    Lab Status: Final result Specimen: Blood from Arm, Left Updated: 08/01/23 2125     Sodium 135 mmol/L      Potassium 4.2 mmol/L      Chloride 103 mmol/L      CO2 26 mmol/L      ANION GAP 6 mmol/L      BUN 24 mg/dL      Creatinine 1.47 mg/dL      Glucose 140 mg/dL      Calcium 8.7 mg/dL      AST 37 U/L      ALT 31 U/L      Alkaline Phosphatase 83 U/L      Total Protein 6.6 g/dL      Albumin 3.7 g/dL      Total Bilirubin 1.24 mg/dL      eGFR 48 ml/min/1.73sq m     Narrative:      [facility] (CKD):   •  Stage 1 with normal or high GFR (GFR > 90 mL/min/1.73 square meters)  •  Stage 2 Mild CKD (GFR = 60-89 mL/min/1.73 square meters)  •  Stage 3A Moderate CKD (GFR = 45-59 mL/min/1.73 square meters)  •  Stage 3B Moderate CKD (GFR = 30-44 mL/min/1.73 square meters)  •  Stage 4 Severe CKD (GFR = 15-29 mL/min/1.73 square meters)  •  Stage 5 End Stage CKD (GFR <15 mL/min/1.73 square meters)  Note: GFR calculation is accurate only with a steady state creatinine    Lipase [[id number]]  (Normal) Collected: 08/01/23 2038    Lab Status: Final result Specimen: Blood from Arm, Left Updated: 08/01/23 2125     Lipase 22 u/L     CBC and differential [[id number]]  (Abnormal) Collected: 08/01/23 2038    Lab Status: Final result Specimen: Blood from Arm, Left Updated: 08/01/23 2057     WBC 6.50 Thousand/uL      RBC 4.39 Million/uL      Hemoglobin 13.0 g/dL      Hematocrit 42.3 %      MCV 96 fL      MCH 29.6 pg      MCHC 30.7 g/dL      RDW 14.6 %      MPV 12.9 fL      Platelets 075 Thousands/uL      nRBC 0 /100 WBCs      Neutrophils Relative 42 %      Immat GRANS % 0 %      Lymphocytes Relative 40 %      Monocytes Relative 15 %      Eosinophils Relative 2 %      Basophils Relative 1 %      Neutrophils Absolute 2.71 Thousands/µL      Immature Grans Absolute 0.01 Thousand/uL      Lymphocytes Absolute 2.58 Thousands/µL      Monocytes Absolute 1.00 Thousand/µL      Eosinophils Absolute 0.12 Thousand/µL      Basophils Absolute 0.08 Thousands/µL                  CTA dissection protocol chest/abdomen/pelvis   Final Result by Celsa Green DO (08/01 2251)      No aortic aneurysm or dissection. Moderate right and small left pleural effusions with interlobular septal thickening and scattered subtle groundglass opacities. Findings are suggestive of developing pulmonary edema. Cardiomegaly         The study was marked in EPIC for immediate notification.       Workstation performed: [id number] Procedures  ECG 12 Lead Documentation Only    Date/Time: 8/1/2023 8:09 PM    Performed by: Sadaf Weir PA-C  Authorized by: Sadaf Weir PA-C    Indications / Diagnosis:  Chest pain  ECG reviewed by me, the ED Provider: yes    Patient location:  ED  Previous ECG:     Previous ECG:  Unavailable    Comparison to cardiac monitor: No    Interpretation:     Interpretation: abnormal    Quality:     Tracing quality:  Limited by artifact  Rate:     ECG rate:  112    ECG rate assessment: normal    Rhythm:     Rhythm: A-V block and atrial flutter    Ectopy:     Ectopy: none    QRS:     QRS axis:  Right    QRS intervals:  Normal  Conduction:     Conduction: normal    ST segments:     ST segments:  Non-specific  T waves:     T waves: non-specific    ECG 12 Lead Documentation Only    Date/Time: 8/1/2023 10:31 PM    Performed by: Sadaf Weir PA-C  Authorized by: Sadaf Weir PA-C    Indications / Diagnosis:  Delta  ECG reviewed by me, the ED Provider: yes    Patient location:  ED  Previous ECG:     Previous ECG:  Compared to current    Similarity:  No change    Comparison to cardiac monitor: No    Interpretation:     Interpretation: abnormal    Quality:     Tracing quality:  Limited by artifact  Rate:     ECG rate:  100    ECG rate assessment: normal    Rhythm:     Rhythm: A-V block and atrial flutter    Ectopy:     Ectopy: none    QRS:     QRS axis:  Right    QRS intervals:  Normal  Conduction:     Conduction: normal    ST segments:     ST segments:  Non-specific  T waves:     T waves: non-specific               ED Course  ED Course as of 08/02/23 0023   Tue Aug 01, 2023   2045 ECG 12 lead  Atrial flutter with variable A-V block  Rightward axis  ST & T wave abnormality, consider inferior ischemia  Abnormal ECG  No previous ECGs available  Confirmed by Denis Weston ([id number]) on 8/1/2023 8:34:29 PM   2050 Pt placed on NC, sat dropped to high 70s-80s with good pleth.     2127 eGFR: 48   2127 Creatinine(!): 1.47   2144 hs TnI 0hr: 25  Need delta   2213 BNP(!): 1,244   2258 CTA dissection protocol chest/abdomen/pelvis  IMPRESSION:     No aortic aneurysm or dissection.     Moderate right and small left pleural effusions with interlobular septal thickening and scattered subtle groundglass opacities. Findings are suggestive of developing pulmonary edema.     Cardiomegaly        The study was marked in EPIC for immediate notification. 2311 ECG 12 lead  Atrial flutter with variable A-V block  Rightward axis  ST & T wave abnormality, consider inferior ischemia  Prolonged QT  Abnormal ECG  When compared with ECG of 01-AUG-2023 20:05,  T wave inversion less evident in Lateral leads  Confirmed by Dario Potter ([id number]) on 8/1/2023 11:05:54 PM   2320 Delta 2hr hsTnI: 0                   SBIRT 22yo+    Flowsheet Row Most Recent Value   Initial Alcohol Screen: US AUDIT-C     1. How often do you have a drink containing alcohol? 0 Filed at: 08/01/2023 2040   2. How many drinks containing alcohol do you have on a typical day you are drinking? 0 Filed at: 08/01/2023 2040   3a. Male UNDER 65: How often do you have five or more drinks on one occasion? 0 Filed at: 08/01/2023 2040   3b. FEMALE Any Age, or MALE 65+: How often do you have 4 or more drinks on one occassion? 0 Filed at: 08/01/2023 2040   Audit-C Score 0 Filed at: 08/01/2023 2040   TANNER: How many times in the past year have you. .. Used an illegal drug or used a prescription medication for non-medical reasons? Never Filed at: 08/01/2023 2040                    Medical Decision Making      DDX including but not limited to: ACS, MI, PE, PTX,  pneumonia, pleural effusion, CHF, pneumonia, dissection, pleurisy, pericarditis, myocarditis, GI etiology. Patient presenting to the ED for evaluation of shortness of breath, chest pain, epigastric pain and palpitations.   Will order CBC for evaluation of anemia and infection, CMP for evaluation of LFTs, kidney function and electrolyte abnormalities, lipase for evaluation of pancreatitis. Troponin for evaluation of heart strain and EKG for the evaluation of arrhythmia and ACS. Coags for eval of clotting factors. TSH for evaluation of cause of arrhythmia and BNP for evaluation of heart failure exacerbation. CTA dissection study for evaluation of acute aortic dissection. Informed by RN that patient sat dropped to high 70s/low 80s and patient was placed on 2 L nasal cannula with improvement into the 90s. Appears to be in acute heart failure exacerbation, Lasix given with mild improvement of symptoms. Patient has a moderate right and small left pleural effusion. Patient also has new oxygen requirement and is currently on nasal cannula. At the time of admission, the patient is in no acute distress. I discussed with the patient and family the diagnosis, treatment plan, and plan for admission; pt was given the opportunity to ask questions and verbalized understanding. The patient agrees with the plan of care and admission at this time. Atrial flutter ([address]): chronic illness or injury  CHF exacerbation ([address]): acute illness or injury  Hypoxia: acute illness or injury  Pleural effusion: acute illness or injury  Amount and/or Complexity of Data Reviewed  Labs: ordered. Decision-making details documented in ED Course. Radiology: ordered. Decision-making details documented in ED Course. ECG/medicine tests:  Decision-making details documented in ED Course. Risk  Prescription drug management. Decision regarding hospitalization.           Disposition  Final diagnoses:   CHF exacerbation ([address])   Pleural effusion   Hypoxia   Atrial flutter ([address])     Time reflects when diagnosis was documented in both MDM as applicable and the Disposition within this note     Time User Action Codes Description Comment    8/1/2023 11:19 PM Julio Cesar Singh [I50.9] CHF exacerbation ([address])     8/1/2023 11:20 PM Lauren Martel Add [J90] Pleural effusion     8/1/2023 11:20 PM Marcio Getting Add [R09.02] Hypoxia     8/1/2023 11:20 PM Marcio Getting Add [I48.92] Atrial flutter [facility])       ED Disposition     ED Disposition   Admit    Condition   Stable    Date/Time   Tue Aug 1, 2023 11:30 PM    Comment   Case was discussed with NONA and the patient's admission status was agreed to be Admission Status: observation status to the service of Dr. Doyce Paget. Follow-up Information    None         Current Discharge Medication List      CONTINUE these medications which have NOT CHANGED    Details   apixaban (ELIQUIS) 5 mg Take 5 mg by mouth 2 (two) times a day      aspirin (ECOTRIN LOW STRENGTH) 81 mg EC tablet Take 81 mg by mouth daily      atorvastatin (LIPITOR) 40 mg tablet Take 40 mg by mouth daily      furosemide (LASIX) 40 mg tablet Take 40 mg by mouth daily      losartan (COZAAR) 50 mg tablet Take 50 mg by mouth daily      metoprolol succinate (TOPROL-XL) 50 mg 24 hr tablet Take 50 mg by mouth 2 (two) times a day             No discharge procedures on file.     PDMP Review       Value Time User    PDMP Reviewed  Yes 8/1/2023 11:57 PM Jess Vences PA-C          ED Provider  Electronically Signed by [facility]  08/02/23 0315

## 2023-08-02 NOTE — ASSESSMENT & PLAN NOTE
· Patient has a prior history of atrial flutter, status post ablation  · Current history of paroxysmal atrial fibrillation  · Patient was previously on Coumadin however was discharged from the [facility] Coumadin clinic due to nonadherence  · Had been prescribed Eliquis as an outpatient however unable to afford out-of-pocket cost: [facility] cardiology working with [facility] to obtain insurance for patient  · Currently rate controlled on home dose of metoprolol XL 50 mg twice daily  · Had been restarted on Eliquis on admission:  We will coordinate with case management regarding availability of program supplying Eliquis at discharge

## 2023-08-02 NOTE — ASSESSMENT & PLAN NOTE
Lab Results   Component Value Date    EGFR 51 08/02/2023    EGFR 48 08/01/2023    CREATININE 1.40 (H) 08/02/2023    CREATININE 1.47 (H) 08/01/2023     · Baseline Cr: 1.0-1.4  • Creatinine currently at his baseline: Monitor with diuresis  • Monitor closely after patient received IV contrast with CAT scan in the ER

## 2023-08-02 NOTE — ASSESSMENT & PLAN NOTE
Wt Readings from Last 3 Encounters:   08/01/23 75.3 kg (166 lb 0.1 oz)     Presenting with worsening dyspnea on exertion, orthopnea, trace LE edema. [address] [facility] cardiology hx of ischemic cardiomyopathy.  Reports medication compliance     BNP 1244  CTA reviewed with pleural effusions, septal thickening, subtle GGO, cardiomegaly, without aneurysm/dissection   TTE 08/2020: LVEF 20%, inferior/basal akinesis, severe RV dilation, MV thickening, severe AV thickening   Cardiac cath 08/2020: severe stable multivessel CAD, mild diffuse left main disease, chronic 100% proximal LAD occlusion, mid/distal LCx 70% occlusion, chronic 100% mid R occlusion   Dry Weight: ~160lbs     Plan:  • 80 Lasix in ED with good response, Uptitrate diuretic from lasix 40mg PO daily to lasix 50mg IV BID   • Continue PTA metoprolol XL 50mg twice daily, Losartan 50mg  • Update TTE  • Monitor daily weight, I/Os, cardiac diet (sodium restriction), monitor K>4, Mg>2

## 2023-08-02 NOTE — ED NOTES
Monitor intermittently alarming SpO2 in high 70s-80s, with 2 stars and good pleth. Patient placed on 2L nasal cannula, SpO2 at 92%. Provider made aware.      Isiah Whittaker RN  08/01/23 2049

## 2023-08-02 NOTE — ASSESSMENT & PLAN NOTE
In atrial flutter on admission, previously had [facility] D[facility]    Plan:  • HR fairly controlled on admission   • Continue metoprolol XL 50mg BID   • Anticoagulation: Eliquis 5mg BID, reports compliance, previously worked with [facility] case management for coverage

## 2023-08-02 NOTE — H&P
[address] [facility]  H&P  Name: Hiren Garza 75 y.o. male I MRN: [id number]  Unit/Bed#: E4 -01 I Date of Admission: 8/1/2023   Date of Service: 8/2/2023 I Hospital Day: 0      Assessment/Plan   * Acute on chronic combined systolic and diastolic congestive heart failure [facility])  Assessment & Plan  Wt Readings from Last 3 Encounters:   08/01/23 75.3 kg (166 lb 0.1 oz)     Presenting with worsening dyspnea on exertion, orthopnea, trace LE edema. [facility] cardiology hx of ischemic cardiomyopathy.  Reports medication compliance     BNP 1244  CTA reviewed with pleural effusions, septal thickening, subtle GGO, cardiomegaly, without aneurysm/dissection   TTE 08/2020: LVEF 20%, inferior/basal akinesis, severe RV dilation, MV thickening, severe AV thickening   Cardiac cath 08/2020: severe stable multivessel CAD, mild diffuse left main disease, chronic 100% proximal LAD occlusion, mid/distal LCx 70% occlusion, chronic 100% mid R occlusion   Dry Weight: ~160lbs     Plan:  • 80 Lasix in ED with good response, Uptitrate diuretic from lasix 40mg PO daily to lasix 50mg IV BID   • Continue PTA metoprolol XL 50mg twice daily, Losartan 50mg  • Update TTE  • Monitor daily weight, I/Os, cardiac diet (sodium restriction), monitor K>4, Mg>2     Pleural effusion, bilateral  Assessment & Plan  Likely transudative effusion secondary to CHF    Plan:  • CTA reviewed with bilateral pleural effusions with interlobular septal thickening   • Attempt aggressive diuresis, repeat CXR after diuresis for evaluation of resolution       Prediabetes  Assessment & Plan  Last A1c 6.3    Plan:  • Monitor AC/HS accucheck      Uninsured  Assessment & Plan  Case management consultation    Typical atrial flutter ([address])  Assessment & Plan  In atrial flutter on admission, previously had ROSIBEL DCCV    Plan:  • HR fairly controlled on admission   • Continue metoprolol XL 50mg BID   • Anticoagulation: Eliquis 5mg BID, reports compliance, previously worked with [facility] case management for coverage      Nonrheumatic aortic valve insufficiency  Assessment & Plan  Murmur noted, last echo with severe AV thickening     Coronary artery disease involving native coronary artery of native heart without angina pectoris  Assessment & Plan  Noted CABG x2    Plan:  • Continue ASA/atorvastatin/antihypertensives  • Cardiac diet      CKD (chronic kidney disease), stage III [facility])  Assessment & Plan  Lab Results   Component Value Date    EGFR 48 08/01/2023    CREATININE 1.47 (H) 08/01/2023     In the setting of HTN, cardiorenal disease   Baseline Cr: 1.0-1.4    Plan:  • Admit Cr: 1.47 > trend  • Monitor renal function, renal dose medications, maintain normotension/euvolemia, avoid nephrotoxic agents, I&Os    Cardiomyopathy, ischemic  Assessment & Plan  Hx of CAD, CABG x2, last EF 20%    Plan:  • EKG with Atrial flutter, inferior ST changes  • Trop 25>25  • Last cardiology note reports he is not a [place] citizen/no insurance. [facility] care was involved, he previously was unable to afford 2D echocardiograms/ICD implantation      VTE Pharmacologic Prophylaxis: VTE Score: 4 Moderate Risk (Score 3-4) - Pharmacological DVT Prophylaxis Ordered: heparin. Code Status: Level 1 - Full Code   Discussion with family: Update in AM.     Anticipated Length of Stay: Patient will be admitted on an observation basis with an anticipated length of stay of less than 2 midnights secondary to CHF. Total Time Spent on Date of Encounter in care of patient: 75 minutes This time was spent on one or more of the following: performing physical exam; counseling and coordination of care; obtaining or reviewing history; documenting in the medical record; reviewing/ordering tests, medications or procedures; communicating with other healthcare professionals and discussing with patient's family/caregivers. Chief Complaint: epigastric pain    History of Present Illness:  Festus Cordero is a 75 y.o. male with a PMH of ischemic cardiomyopathy, HFrEF, Aflutter on Eliquis, CKD, CAD s/p CABG who presents with abdominal pain. History obtained from patient, chart review, discussion with ED PA. He presents tonight with epigastric pain/chest pain. The pain was sudden and began this evening without inciting event. He describes the pain in the middle of his abdomen, is intermittently sharp, and occasionally radiates to his chest/lower abdomen. States while he was urinating the pain was severe his leg gave out and he fell over. The pain did not subside so he presented to the ED. After pain medications in the ED his symptoms greatly improved. Over the past few weeks he has been more short of breath on exertion. Reporting he can only walk a few steps without becoming short of breath. He has had a persistent cough since being on Lisinopril but recently changed to Losartan which has been okay. He denies other changes in medications or missed medications except this evenings dose. He denies tobacco/alcohol/recreational drug use. Review of Systems:  Review of Systems   Constitutional: Positive for fatigue. Negative for chills and fever. Respiratory: Positive for cough and shortness of breath. Cardiovascular: Positive for chest pain. Negative for palpitations and leg swelling. Gastrointestinal: Positive for abdominal pain. Negative for constipation, diarrhea, nausea and vomiting. Neurological: Negative for syncope. Past Medical and Surgical History:   History reviewed. No pertinent past medical history. Past Surgical History:   Procedure Laterality Date   • CORONARY ARTERY BYPASS GRAFT         Meds/Allergies:  Prior to Admission medications    Medication Sig Start Date End Date Taking?  Authorizing Provider   apixaban (ELIQUIS) 5 mg Take 5 mg by mouth 2 (two) times a day 3/30/23 3/29/24 Yes Historical Provider, MD   aspirin (ECOTRIN LOW STRENGTH) 81 mg EC tablet Take 81 mg by mouth daily 3/15/23  Yes Historical Provider, MD   atorvastatin (LIPITOR) 40 mg tablet Take 40 mg by mouth daily 3/15/23 3/14/24 Yes Historical Provider, MD   furosemide (LASIX) 40 mg tablet Take 40 mg by mouth daily 3/15/23 3/14/24 Yes Historical Provider, MD   losartan (COZAAR) 50 mg tablet Take 50 mg by mouth daily 7/25/23 7/24/24 Yes Historical Provider, MD   metoprolol succinate (TOPROL-XL) 50 mg 24 hr tablet Take 50 mg by mouth 2 (two) times a day 3/30/23 3/24/24 Yes Historical Provider, MD     I have reviewed home medications with patient personally. Allergies: No Known Allergies    Social History:  Marital Status: Single   Occupation:   Patient Pre-hospital Living Situation: Home  Patient Pre-hospital Level of Mobility: walks  Patient Pre-hospital Diet Restrictions: cardiac  Substance Use History:   Social History     Substance and Sexual Activity   Alcohol Use Never     Social History     Tobacco Use   Smoking Status Never   Smokeless Tobacco Never     Social History     Substance and Sexual Activity   Drug Use Never       Family History:  History reviewed. No pertinent family history. Physical Exam:     Vitals:   Blood Pressure: 143/100 (08/02/23 0017)  Pulse: 89 (08/02/23 0017)  Temperature: (!) 97.3 °F (36.3 °C) (08/02/23 0017)  Temp Source: Temporal (08/02/23 0017)  Respirations: 20 (08/02/23 0017)  Weight - Scale: 72 kg (158 lb 11.7 oz) (08/02/23 0017)  SpO2: 100 % (08/02/23 0017)    Physical Exam  Vitals and nursing note reviewed. Constitutional:       General: He is not in acute distress. Appearance: He is well-developed. HENT:      Head: Normocephalic and atraumatic. Eyes:      Conjunctiva/sclera: Conjunctivae normal.   Cardiovascular:      Rate and Rhythm: Normal rate and regular rhythm. Heart sounds: No murmur heard. Pulmonary:      Effort: Pulmonary effort is normal. No respiratory distress. Comments: Diminished BL lower lobes  Abdominal:      Palpations: Abdomen is soft. Tenderness:  There is no abdominal tenderness. Musculoskeletal:         General: No swelling. Cervical back: Neck supple. Right lower leg: No edema. Left lower leg: No edema. Skin:     General: Skin is warm and dry. Capillary Refill: Capillary refill takes 2 to 3 seconds. Neurological:      Mental Status: He is alert and oriented to person, place, and time. Psychiatric:         Mood and Affect: Mood normal.          Additional Data:     Lab Results:  Results from last 7 days   Lab Units 08/01/23 2038   WBC Thousand/uL 6.50   HEMOGLOBIN g/dL 13.0   HEMATOCRIT % 42.3   PLATELETS Thousands/uL 166   NEUTROS PCT % 42*   LYMPHS PCT % 40   MONOS PCT % 15*   EOS PCT % 2     Results from last 7 days   Lab Units 08/01/23 2038   SODIUM mmol/L 135   POTASSIUM mmol/L 4.2   CHLORIDE mmol/L 103   CO2 mmol/L 26   BUN mg/dL 24   CREATININE mg/dL 1.47*   ANION GAP mmol/L 6   CALCIUM mg/dL 8.7   ALBUMIN g/dL 3.7   TOTAL BILIRUBIN mg/dL 1.24*   ALK PHOS U/L 83   ALT U/L 31   AST U/L 37   GLUCOSE RANDOM mg/dL 140     Results from last 7 days   Lab Units 08/01/23 2038   INR  1.19                   Lines/Drains:  Invasive Devices     Peripheral Intravenous Line  Duration           Peripheral IV 08/01/23 Left Antecubital 1 day                    Imaging: Reviewed radiology reports from this admission including: chest CT scan and abdominal/pelvic CT and Personally reviewed the following imaging: chest CT scan and abdominal/pelvic CT  CTA dissection protocol chest/abdomen/pelvis   Final Result by Raelene Dubin, DO (08/01 2251)      No aortic aneurysm or dissection. Moderate right and small left pleural effusions with interlobular septal thickening and scattered subtle groundglass opacities. Findings are suggestive of developing pulmonary edema. Cardiomegaly         The study was marked in EPIC for immediate notification.       Workstation performed: [id number]             EKG and Other Studies Reviewed on Admission: · EKG: Atrial flutter. . ** Please Note: This note has been constructed using a voice recognition system.  **

## 2023-08-02 NOTE — PLAN OF CARE
Problem: INFECTION - ADULT  Goal: Absence or prevention of progression during hospitalization  Description: INTERVENTIONS:  - Assess and monitor for signs and symptoms of infection  - Monitor lab/diagnostic results  - Monitor all insertion sites, i.e. indwelling lines, tubes, and drains  - Monitor endotracheal if appropriate and nasal secretions for changes in amount and color  - [place] appropriate cooling/warming therapies per order  - Administer medications as ordered  - Instruct and encourage patient and family to use good hand hygiene technique  - Identify and instruct in appropriate isolation precautions for identified infection/condition  Outcome: Progressing  Goal: Absence of fever/infection during neutropenic period  Description: INTERVENTIONS:  - Monitor WBC    Outcome: Progressing     Problem: DISCHARGE PLANNING  Goal: Discharge to home or other facility with appropriate resources  Description: INTERVENTIONS:  - Identify barriers to discharge w/patient and caregiver  - Arrange for needed discharge resources and transportation as appropriate  - Identify discharge learning needs (meds, wound care, etc.)  - Arrange for interpretive services to assist at discharge as needed  - Refer to Case Management Department for coordinating discharge planning if the patient needs post-hospital services based on physician/advanced practitioner order or complex needs related to functional status, cognitive ability, or social support system  Outcome: Progressing

## 2023-08-02 NOTE — ASSESSMENT & PLAN NOTE
Hx of CAD, CABG x2, last EF 20%    Plan:  • EKG with Atrial flutter, inferior ST changes  • Trop 25>25  • Last cardiology note reports he is not a [place] citizen/no insurance.  [facility] was involved, he previously was unable to afford 2D echocardiograms/ICD implantation

## 2023-08-02 NOTE — PROGRESS NOTES
[facility]  Progress Note  Name: Anita Mora  MRN: [id number]  Unit/Bed#: E4 -01 I Date of Admission: 8/1/2023   Date of Service: 8/2/2023 I Hospital Day: 0    Assessment/Plan   * Acute on chronic combined systolic and diastolic congestive heart failure [facility])  Assessment & Plan  Patient is a 26-year-old male with past medical history significant for coronary artery disease status post CABG, and ischemic cardiomyopathy with an ejection fraction in the 25s, as well as paroxysmal atrial fibrillation, not on anticoagulation, who presented with worsening dyspnea on exertion, orthopnea    · Patient has a history of chronic combined systolic and diastolic congestive heart failure and follows with [facility] ([place], [place]) cardiology: Diagnosed with ischemic cardiomyopathy  · Most recent 2D echocardiogram 08/2020: LVEF 20%, inferior/basal akinesis, severe RV dilation, MV thickening, severe AV thickening   · Patient had repeat 2D echocardiogram ordered as an outpatient several months ago, however as he only have insurance coverage, he was unable to pay out-of-pocket for this study  · Patient is prescribed Lasix 40 mg daily, and notes he is compliant with this medication  · Patient presented with volume overload, dyspnea on exertion and orthopnea, interstitial edema on CAT scan, and proBNP of 124  · Patient was diuresed with 80 mg IV Lasix in the ER and started on 50 mg IV twice daily  · No significant decrease in weight thus far, compared with admission weight (ER weight not felt to be accurate)  · Continue home metoprolol XL 50 mg twice daily and home losartan 50 mg daily (was recently changed by his outpatient cardiologist from lisinopril)  · Outpatient cardiology office records note he was not able to receive ICD due to lack of insurance coverage:  Their office is coordinating with [employer] to attempt to arrange insurance  · Repeat 2D echocardiogram performed today: Preliminary left ventricular ejection fraction 27%: Await official report      Atrial fibrillation [facility])  Assessment & Plan  · Patient has a prior history of atrial flutter, status post ablation  · Current history of paroxysmal atrial fibrillation  · Patient was previously on Coumadin however was discharged from the [facility] Coumadin clinic due to nonadherence  · Had been prescribed Eliquis as an outpatient however unable to afford out-of-pocket cost: [facility] cardiology working with [facility] to obtain insurance for patient  · Currently rate controlled on home dose of metoprolol XL 50 mg twice daily  · Had been restarted on Eliquis on admission: We will coordinate with case management regarding availability of program supplying Eliquis at discharge      Coronary artery disease involving native coronary artery of native heart without angina pectoris  Assessment & Plan  • Patient has a history of coronary artery disease status post CABG in 2008  • Catheterization in 2020 with patent LIMA-LAD, and mild disease of SVG-OM.   Native left circumflex with diffuse disease, RCA with chronic total occlusion however collateral flow  • Continue home aspirin, beta-blocker, statin, ARB      Pleural effusion, bilateral  Assessment & Plan  • Patient with moderate right and small left pleural effusions, in the setting of interstitial edema, most likely secondary to CHF  • Monitor with diuresis  • No current respiratory distress or indication for urgent thoracentesis      Uninsured  Assessment & Plan  Case management consultation: Patient's [facility] cardiologist is coordinating with [facility] population health department to assist patient with obtaining insurance as he currently does not have a Social Security card, or access to insurance    Nonrheumatic aortic valve insufficiency  Assessment & Plan  · Patient with a history of moderate aortic stenosis on prior echocardiograms  · Repeat echocardiogram pending    CKD (chronic kidney disease), stage III [facility])  Assessment & Plan  Lab Results   Component Value Date    EGFR 51 08/02/2023    EGFR 48 08/01/2023    CREATININE 1.40 (H) 08/02/2023    CREATININE 1.47 (H) 08/01/2023     · Baseline Cr: 1.0-1.4  • Creatinine currently at his baseline: Monitor with diuresis  • Monitor closely after patient received IV contrast with CAT scan in the ER           Chest pain:  Pt notes a several week h/o chest pain, starting in abdomen and radiating up the chest.  Denies exacerbating or alleviating factors. Has been constantly present for weeks. EKG with inferior lateral T wave inversions, however these are not new, when compared with previous EKG history at [facility], seen 7/2. Patient's troponins unremarkable. No evidence of acute ischemia. Continue medical management    VTE Pharmacologic Prophylaxis: RX contraindicated due to: eliquis  VTE Mechanical Prophylaxis: sequential compression device        Certification Statement: The patient will continue to require additional inpatient hospital stay due to need for further acute intervention for chf on iv lasix    Status: inpatient     dw pts nurse and CM    ===================================================================    Subjective:  Patient notes he feels better today. He notes at home he had shortness of breath at rest, as well as dyspnea on exertion. He denies any lower extremity edema. He noted orthopnea. He noted weight gain. He notes he has been compliant with his diuretic at home. He relates he does have an adequate supply of his pills. He did not run out. Patient notes prior to admission he was having discomfort in his abdomen, which had radiating up into his chest.  It been present for several weeks. No exacerbating or alleviating factors. He denies any nausea or vomiting. Denies any dizziness or lightheadedness.   Notes he was able to tolerate p.o.  Denies heartburn or indigestion. Physical Exam:   Temp:  [95.6 °F (35.3 °C)-98 °F (36.7 °C)] 96 °F (35.6 °C)  HR:  [] 70  Resp:  [20-22] 20  BP: (109-143)/() 112/97    Gen:  Pleasant, non-tachypnic, non-dyspnic. Conversant. Speaking complete sentences without a stop for dyspnea  Heart: Regularly irregular, S1S2 present,/6 systolic ejection murmur. No rub or gallop  Lungs: Bibasilar crackles. Diminished air movement in both bases. .  No wheezing,or rhonchi. No accessory muscle use or respiratory distress. Abd: soft, non-tender, non-distended. NABS, no guarding, rebound or peritoneal signs. Extremities: no clubbing, cyanosis or edema. 2+pedal pulses bilaterally. Neuro: awake, alert. Fluent speech. Interactive  Skin: warm and dry: no petechiae, purpura and rash. LABS:   Results from last 7 days   Lab Units 08/02/23 0427 08/01/23 2038   WBC Thousand/uL 5.54 6.50   HEMOGLOBIN g/dL 13.1 13.0   HEMATOCRIT % 41.9 42.3   PLATELETS Thousands/uL 149 166     Results from last 7 days   Lab Units 08/02/23 0427 08/01/23 2038   POTASSIUM mmol/L 4.4 4.2   CHLORIDE mmol/L 102 103   CO2 mmol/L 29 26   BUN mg/dL 25 24   CREATININE mg/dL 1.40* 1.47*   CALCIUM mg/dL 9.1 8.7       Hospital Data:    8/1: CTA chest/abdomen dissection protocol  No aortic aneurysm or dissection. Moderate right and small left pleural effusions with interlobular septal thickening and scattered subtle groundglass opacities. Findings are suggestive of developing pulmonary edema. Cardiomegaly      ---------------------------------------------------------------------------------------------------------------  This note has been constructed using a voice recognition system.

## 2023-08-02 NOTE — ASSESSMENT & PLAN NOTE
· Pt notes a several week h/o chest pain, starting in abdomen and radiating up the chest.  Denies exacerbating or alleviating factors. Has been constantly present for weeks. · EKG with inferior lateral T wave inversions, however these are not new, when compared with previous EKG history at [facility], seen 7/2. · Patient's troponins unremarkable. No evidence of acute ischemia.     · Continue medical management with aspirin, beta-blocker, statin

## 2023-08-02 NOTE — ASSESSMENT & PLAN NOTE
Likely transudative effusion secondary to CHF    Plan:  • CTA reviewed with bilateral pleural effusions with interlobular septal thickening   • Attempt aggressive diuresis, repeat CXR after diuresis for evaluation of resolution

## 2023-08-02 NOTE — ASSESSMENT & PLAN NOTE
Lab Results   Component Value Date    EGFR 48 08/01/2023    CREATININE 1.47 (H) 08/01/2023     In the setting of HTN, cardiorenal disease   Baseline Cr: 1.0-1.4    Plan:  • Admit Cr: 1.47 > trend  • Monitor renal function, renal dose medications, maintain normotension/euvolemia, avoid nephrotoxic agents, I&Os

## 2023-08-03 ENCOUNTER — APPOINTMENT (INPATIENT)
Dept: RADIOLOGY | Facility: HOSPITAL | Age: 68
DRG: 194 | End: 2023-08-03
Payer: COMMERCIAL

## 2023-08-03 LAB
ANION GAP SERPL CALCULATED.3IONS-SCNC: 4 MMOL/L
BASOPHILS # BLD AUTO: 0.07 THOUSANDS/ÂΜL (ref 0–0.1)
BASOPHILS NFR BLD AUTO: 1 % (ref 0–1)
BNP SERPL-MCNC: 593 PG/ML (ref 0–100)
BUN SERPL-MCNC: 23 MG/DL (ref 5–25)
CALCIUM SERPL-MCNC: 8.8 MG/DL (ref 8.4–10.2)
CHLORIDE SERPL-SCNC: 103 MMOL/L (ref 96–108)
CO2 SERPL-SCNC: 31 MMOL/L (ref 21–32)
CREAT SERPL-MCNC: 1.26 MG/DL (ref 0.6–1.3)
EOSINOPHIL # BLD AUTO: 0.24 THOUSAND/ÂΜL (ref 0–0.61)
EOSINOPHIL NFR BLD AUTO: 5 % (ref 0–6)
ERYTHROCYTE [DISTWIDTH] IN BLOOD BY AUTOMATED COUNT: 14.6 % (ref 11.6–15.1)
GFR SERPL CREATININE-BSD FRML MDRD: 58 ML/MIN/1.73SQ M
GLUCOSE SERPL-MCNC: 101 MG/DL (ref 65–140)
HCT VFR BLD AUTO: 44 % (ref 36.5–49.3)
HGB BLD-MCNC: 13.8 G/DL (ref 12–17)
IMM GRANULOCYTES # BLD AUTO: 0 THOUSAND/UL (ref 0–0.2)
IMM GRANULOCYTES NFR BLD AUTO: 0 % (ref 0–2)
LYMPHOCYTES # BLD AUTO: 1.65 THOUSANDS/ÂΜL (ref 0.6–4.47)
LYMPHOCYTES NFR BLD AUTO: 32 % (ref 14–44)
MCH RBC QN AUTO: 29.5 PG (ref 26.8–34.3)
MCHC RBC AUTO-ENTMCNC: 31.4 G/DL (ref 31.4–37.4)
MCV RBC AUTO: 94 FL (ref 82–98)
MONOCYTES # BLD AUTO: 0.59 THOUSAND/ÂΜL (ref 0.17–1.22)
MONOCYTES NFR BLD AUTO: 12 % (ref 4–12)
NEUTROPHILS # BLD AUTO: 2.56 THOUSANDS/ÂΜL (ref 1.85–7.62)
NEUTS SEG NFR BLD AUTO: 50 % (ref 43–75)
NRBC BLD AUTO-RTO: 0 /100 WBCS
PLATELET # BLD AUTO: 155 THOUSANDS/UL (ref 149–390)
PMV BLD AUTO: 12.5 FL (ref 8.9–12.7)
POTASSIUM SERPL-SCNC: 3.8 MMOL/L (ref 3.5–5.3)
RBC # BLD AUTO: 4.68 MILLION/UL (ref 3.88–5.62)
SODIUM SERPL-SCNC: 138 MMOL/L (ref 135–147)
WBC # BLD AUTO: 5.11 THOUSAND/UL (ref 4.31–10.16)

## 2023-08-03 PROCEDURE — 99255 IP/OBS CONSLTJ NEW/EST HI 80: CPT | Performed by: INTERNAL MEDICINE

## 2023-08-03 PROCEDURE — 80048 BASIC METABOLIC PNL TOTAL CA: CPT | Performed by: INTERNAL MEDICINE

## 2023-08-03 PROCEDURE — 85025 COMPLETE CBC W/AUTO DIFF WBC: CPT | Performed by: INTERNAL MEDICINE

## 2023-08-03 PROCEDURE — 99232 SBSQ HOSP IP/OBS MODERATE 35: CPT | Performed by: INTERNAL MEDICINE

## 2023-08-03 PROCEDURE — 83880 ASSAY OF NATRIURETIC PEPTIDE: CPT | Performed by: INTERNAL MEDICINE

## 2023-08-03 PROCEDURE — 71046 X-RAY EXAM CHEST 2 VIEWS: CPT

## 2023-08-03 RX ORDER — METOPROLOL SUCCINATE 25 MG/1
25 TABLET, EXTENDED RELEASE ORAL 2 TIMES DAILY
Status: DISCONTINUED | OUTPATIENT
Start: 2023-08-03 | End: 2023-08-05 | Stop reason: HOSPADM

## 2023-08-03 RX ORDER — FUROSEMIDE 10 MG/ML
50 INJECTION INTRAMUSCULAR; INTRAVENOUS
Status: DISCONTINUED | OUTPATIENT
Start: 2023-08-03 | End: 2023-08-03

## 2023-08-03 RX ADMIN — ATORVASTATIN CALCIUM 40 MG: 80 TABLET, FILM COATED ORAL at 17:22

## 2023-08-03 RX ADMIN — POLYETHYLENE GLYCOL 3350 17 G: 17 POWDER, FOR SOLUTION ORAL at 08:39

## 2023-08-03 RX ADMIN — APIXABAN 5 MG: 5 TABLET, FILM COATED ORAL at 17:22

## 2023-08-03 RX ADMIN — ASPIRIN 81 MG: 81 TABLET, COATED ORAL at 08:39

## 2023-08-03 RX ADMIN — APIXABAN 5 MG: 5 TABLET, FILM COATED ORAL at 08:39

## 2023-08-03 RX ADMIN — METOPROLOL SUCCINATE 25 MG: 25 TABLET, EXTENDED RELEASE ORAL at 17:22

## 2023-08-03 RX ADMIN — FUROSEMIDE 50 MG: 10 INJECTION, SOLUTION INTRAVENOUS at 15:33

## 2023-08-03 NOTE — ASSESSMENT & PLAN NOTE
Patient is a 68-year-old male with past medical history significant for coronary artery disease status post CABG, and ischemic cardiomyopathy with an ejection fraction in the 20s, as well as paroxysmal atrial fibrillation, not on anticoagulation, who presented with worsening dyspnea on exertion, orthopnea    · Patient has a history of chronic combined systolic and diastolic congestive heart failure and follows with [facility] cardiology: Diagnosed with ischemic cardiomyopathy  · Most recent 2D echocardiogram 08/2020: LVEF 20%, inferior/basal akinesis, severe RV dilation, MV thickening, severe AV thickening   · Had repeat 2D echocardiogram ordered as an outpatient several months ago, however as he only have insurance coverage, he was unable to pay out-of-pocket for this study  · Currently on Lasix 40 mg daily outpt, and notes he is compliant with this medication  · Patient presented with volume overload, dyspnea on exertion and orthopnea, interstitial edema on CAT scan, and BNP of 1244  · Diuresed with 80 mg IV Lasix in the ER and started on 50 mg IV twice daily- however missed a dose due to sbp 97. · Weight down from 166 pounds to 158 pounds- however there is question of accuracy of original weight from ed. · Diuresed 1,800. · Hold losartan. Change hold parameters for IV lasix. Reduce metoprolol to 25mg bid. · Possibly close to euvolemic status  · Will repeat cxr due to moderate right pleural effusion  · Will also repeat bnp as fluid status is difficult to assess. · Outpatient cardiology office records note he was not able to receive ICD due to lack of insurance coverage: Their office is coordinating with [employer] to attempt to arrange insurance  · Repeat 2D echo: Ef 25%. Moderate to severe aortic stenosis.    · Will consult cardiology Orbicularis Oris Muscle Flap Text: The defect edges were debeveled with a #15 scalpel blade.  Given that the defect affected the competency of the oral sphincter an orbicularis oris muscle flap was deemed most appropriate to restore this competency and normal muscle function.  Using a sterile surgical marker, an appropriate flap was drawn incorporating the defect. The area thus outlined was incised with a #15 scalpel blade. Following this, the designed flap was carried over into the primary defect and sutured into place.

## 2023-08-03 NOTE — ASSESSMENT & PLAN NOTE
• Hx of CAD, CABG x2, last EF 25% on repeat echo   • Last cardiology note reports he is not a [place] citizen/no insurance. [facility] was involved, he previously was unable to afford 2D echocardiograms/ICD implantation  • Holding arb due during diuresis.  Continue metoprolol, asa, and statin

## 2023-08-03 NOTE — ASSESSMENT & PLAN NOTE
Last A1c 6.3  He would benefit from jardiance due to chf.  However unable currently does not have insurance and concerned about affordability

## 2023-08-03 NOTE — PROGRESS NOTES
[facility]  Progress Note  Name: Lizeth Officer  MRN: [id number]  Unit/Bed#: E4 -01 I Date of Admission: 8/1/2023   Date of Service: 8/3/2023 I Hospital Day: 1    Assessment/Plan   * Acute on chronic combined systolic and diastolic congestive heart failure [facility])  Assessment & Plan  Patient is a 27-year-old male with past medical history significant for coronary artery disease status post CABG, and ischemic cardiomyopathy with an ejection fraction in the 20s, as well as paroxysmal atrial fibrillation, not on anticoagulation, who presented with worsening dyspnea on exertion, orthopnea    · Patient has a history of chronic combined systolic and diastolic congestive heart failure and follows with [facility] cardiology: Diagnosed with ischemic cardiomyopathy  · Most recent 2D echocardiogram 08/2020: LVEF 20%, inferior/basal akinesis, severe RV dilation, MV thickening, severe AV thickening   · Had repeat 2D echocardiogram ordered as an outpatient several months ago, however as he only have insurance coverage, he was unable to pay out-of-pocket for this study  · Currently on Lasix 40 mg daily outpt, and notes he is compliant with this medication  · Patient presented with volume overload, dyspnea on exertion and orthopnea, interstitial edema on CAT scan, and BNP of 1244  · Diuresed with 80 mg IV Lasix in the ER and started on 50 mg IV twice daily- however missed a dose due to sbp 97. · Weight down from 166 pounds to 158 pounds- however there is question of accuracy of original weight from ed. · Diuresed 1,800. · Hold losartan. Change hold parameters for IV lasix. Reduce metoprolol to 25mg bid. · Possibly close to euvolemic status  · Will repeat cxr due to moderate right pleural effusion  · Will also repeat bnp as fluid status is difficult to assess. · Outpatient cardiology office records note he was not able to receive ICD due to lack of insurance coverage:  Their office is coordinating with [facility] to attempt to arrange insurance  · Repeat 2D echo: Ef 25%. Moderate to severe aortic stenosis. · Will consult cardiology       Chest pain  Assessment & Plan  · Pt notes a several week h/o chest pain, starting in abdomen and radiating up the chest.  Denies exacerbating or alleviating factors. Has been constantly present for weeks. · EKG with inferior lateral T wave inversions, however these are not new, when compared with previous EKG history at [facility], seen 7/2. · Patient's troponins unremarkable. No evidence of acute ischemia. · Continue medical management with aspirin, beta-blocker, statin    Pleural effusion, bilateral  Assessment & Plan  • Patient with moderate right and small left pleural effusions, in the setting of interstitial edema, most likely secondary to CHF  • Monitor with diuresis  • No current respiratory distress or indication for urgent thoracentesis  • Repeat imagining today       Prediabetes  Assessment & Plan  Last A1c 6.3  He would benefit from jardiance due to chf.  However unable currently does not have insurance and concerned about affordability     Uninsured  Assessment & Plan  Case management consultation: Patient's [facility] cardiologist is coordinating with [facility] population health department to assist patient with obtaining insurance as he currently does not have a Social Security card, or access to insurance    Atrial fibrillation [facility])  Assessment & Plan  · Patient has a prior history of atrial flutter, status post ablation  · Current history of paroxysmal atrial fibrillation  · Patient was previously on Coumadin however was discharged from the [facility] Coumadin clinic due to nonadherence  · Had been prescribed Eliquis as an outpatient however unable to afford out-of-pocket cost: [facility] cardiology working with [facility] to obtain insurance for patient- currently coordinating with case management here. · Currently rate controlled on home dose of metoprolol XL 50 mg twice daily- however due to lower bp will decrease to 25mg bid. · Had been restarted on Eliquis on admission: We will coordinate with case management regarding availability of program supplying Eliquis at discharge      Nonrheumatic aortic valve insufficiency  Assessment & Plan  · Patient with a history of moderate aortic stenosis on prior echocardiograms  · Repeat echo shows moderate to severe aortic stenosis    Coronary artery disease involving native coronary artery of native heart without angina pectoris  Assessment & Plan  • Patient has a history of coronary artery disease status post CABG in 2008  • Catheterization in 2020 with patent LIMA-LAD, and mild disease of SVG-OM. Native left circumflex with diffuse disease, RCA with chronic total occlusion however collateral flow  • Continue home aspirin, beta-blocker, statin, ARB      CKD (chronic kidney disease), stage III [facility])  Assessment & Plan  Lab Results   Component Value Date    EGFR 58 08/03/2023    EGFR 51 08/02/2023    EGFR 48 08/01/2023    CREATININE 1.26 08/03/2023    CREATININE 1.40 (H) 08/02/2023    CREATININE 1.47 (H) 08/01/2023     · Baseline Cr: 1.0-1.4  • Creatinine currently at his baseline: Monitor with diuresis  • Monitor closely after patient received IV contrast with CAT scan in the ER    Cardiomyopathy, ischemic  Assessment & Plan  • Hx of CAD, CABG x2, last EF 25% on repeat echo   • Last cardiology note reports he is not a [place] citizen/no insurance. [facility] was involved, he previously was unable to afford 2D echocardiograms/ICD implantation  • Holding arb due during diuresis.  Continue metoprolol, asa, and statin              VTE Pharmacologic Prophylaxis: eliquis   Mechanical VTE Prophylaxis in Place: Yes    Education and Discussions with Family / Patient: patient    Current Length of Stay: 1 day(s)  Current Patient Status: Inpatient     Discharge Plan / Estimated Discharge Date: 24 to 48 hours     Code Status: Level 1 - Full Code      Subjective:   Pt seen and examined. Pt states he is breathing better. No worsening short of breath on ambulation. No f/c no cp no n/v/d no abd pain     Objective:     Vitals:   Temp (24hrs), Av.9 °F (36.1 °C), Min:96 °F (35.6 °C), Max:97.2 °F (36.2 °C)    Temp:  [96 °F (35.6 °C)-97.2 °F (36.2 °C)] 97 °F (36.1 °C)  HR:  [69-89] 89  Resp:  [20] 20  BP: ()/(65-97) 97/74  SpO2:  [93 %-98 %] 96 %  Body mass index is 22.78 kg/m². Input and Output Summary (last 24 hours): Intake/Output Summary (Last 24 hours) at 8/3/2023 0855  Last data filed at 8/3/2023 0454  Gross per 24 hour   Intake --   Output 1895 ml   Net -1895 ml       Physical Exam:   Physical Exam  Constitutional:       Appearance: Normal appearance. HENT:      Head: Normocephalic and atraumatic. Eyes:      Extraocular Movements: Extraocular movements intact. Pupils: Pupils are equal, round, and reactive to light. Cardiovascular:      Rate and Rhythm: Normal rate and regular rhythm. Heart sounds: No murmur heard. No friction rub. No gallop. Pulmonary:      Effort: Pulmonary effort is normal. No respiratory distress. Breath sounds: Normal breath sounds. No wheezing, rhonchi or rales. Abdominal:      General: Bowel sounds are normal. There is no distension. Palpations: Abdomen is soft. Tenderness: There is no abdominal tenderness. There is no guarding or rebound. Musculoskeletal:      Right lower leg: No edema. Left lower leg: No edema. Neurological:      Mental Status: He is alert and oriented to person, place, and time.           Additional Data:     Labs:  Results from last 7 days   Lab Units 23  0450   WBC Thousand/uL 5.11   HEMOGLOBIN g/dL 13.8   HEMATOCRIT % 44.0   PLATELETS Thousands/uL 155   NEUTROS PCT % 50   LYMPHS PCT % 32   MONOS PCT % 12 EOS PCT % 5     Results from last 7 days   Lab Units 08/03/23  0450 08/02/23  0427 08/01/23 2038   SODIUM mmol/L 138   < > 135   POTASSIUM mmol/L 3.8   < > 4.2   CHLORIDE mmol/L 103   < > 103   CO2 mmol/L 31   < > 26   BUN mg/dL 23   < > 24   CREATININE mg/dL 1.26   < > 1.47*   ANION GAP mmol/L 4   < > 6   CALCIUM mg/dL 8.8   < > 8.7   ALBUMIN g/dL  --   --  3.7   TOTAL BILIRUBIN mg/dL  --   --  1.24*   ALK PHOS U/L  --   --  83   ALT U/L  --   --  31   AST U/L  --   --  37   GLUCOSE RANDOM mg/dL 101   < > 140    < > = values in this interval not displayed.      Results from last 7 days   Lab Units 08/01/23 2038   INR  1.19                   Recent Cultures (last 7 days):           Lines/Drains:  Invasive Devices     Peripheral Intravenous Line  Duration           Peripheral IV 08/01/23 Left Antecubital 2 days                Telemetry:   Telemetry Orders (From admission, onward)             24 Hour Telemetry Monitoring  Continuous x 24 Hours (Telem)        Question:  Reason for 24 Hour Telemetry  Answer:  Decompensated CHF- and any one of the following: continuous diuretic infusion or total diuretic dose >200 mg daily, associated electrolyte derangement (I.e. K < 3.0), ionotropic drip (continuous infusion), hx of ventricular arrhythmia, or new EF < 35%                    Last 24 Hours Medication List:   Current Facility-Administered Medications   Medication Dose Route Frequency Provider Last Rate   • acetaminophen  650 mg Oral Q4H PRN Godfrey Ruano PA-C     • aluminum-magnesium hydroxide-simethicone  30 mL Oral Q6H PRN Godfrey Ruano PA-C     • apixaban  5 mg Oral BID Godfrey Ruano PA-C     • aspirin  81 mg Oral Daily Godfrey Ruano PA-C     • atorvastatin  40 mg Oral Daily With Mary Gipson PA-C     • furosemide  50 mg Intravenous BID (diuretic) Ana M Mo DO     • metoprolol succinate  25 mg Oral BID Ana M Mo DO     • ondansetron  4 mg Intravenous Q4H PRN Can Ritchie MD     • oxyCODONE  2.5 mg Oral Q4H PRN Marta Pineda PA-C     • perflutren lipid microsphere  0.4 mL/min Intravenous Once in imaging Curry Bowen MD     • polyethylene glycol  17 g Oral Daily Marta Pineda PA-C          Today, Patient Was Seen By: Terry Griffin DO

## 2023-08-03 NOTE — ASSESSMENT & PLAN NOTE
· Patient has a prior history of atrial flutter, status post ablation  · Current history of paroxysmal atrial fibrillation  · Patient was previously on Coumadin however was discharged from the [facility] Coumadin clinic due to nonadherence  · Had been prescribed Eliquis as an outpatient however unable to afford out-of-pocket cost: [facility] cardiology working with [facility] to obtain insurance for patient- currently coordinating with case management here. · Currently rate controlled on home dose of metoprolol XL 50 mg twice daily- however due to lower bp will decrease to 25mg bid. · Had been restarted on Eliquis on admission:  We will coordinate with case management regarding availability of program supplying Eliquis at discharge

## 2023-08-03 NOTE — CONSULTS
Consultation Note - Cardiology   Tony Malave 75 y.o. male MRN: [id number]  Unit/Bed#: E4 -01 Encounter: [id number]  08/03/23  11:07 AM    Encounter date: 08/03/2023  Patient name: Andrés Johnston 75 y.o. male  Encounter providers/authors:   Medical Student: Sven Flower OMS III PCOM   Attending Physician: Shawn Pichardo DO, [address] [place] [address]   Inpatient attending physician: Charlette Combs DO   Primary care provider: No primary care provider on file.   Date of admission: 8/1/2023  Length of stay: 1    Assessment/ Plan:  · Chronic combined systolic and diastolic heart failure  · LVEF 25%, mild LA dilatation, diastolic dysfunction with elevated LAP, RWMAs c/w CAD, mildly reduced RV function, severe LA dilatation, moderate to severe AS, severe MR, moderate TR/KS with PASP 64 mmHg, August 2023  · Precordial chest pain  · Persistent atrial flutter on Eliquis  · CAD  · s/p CABG w/ LIMA-LAD and SVG-OM, 2008  · s/p cath w/ patent LIMA-LAD and mild disease of SVG-OM, diffuse m-dLCx disease and -RCA w/ collaterals from septal perforators, 2020  · Ischemic cardiomyopathy  · Moderate to severe aortic stenosis  · w/ Vmax 2.98 m/s, mean PG 21 mmHg and ROSEMARIE 1.05 cm2, August 2023  · Severe mitral regurgitation  · CKD  · Dyslipidemia    PLAN:  Patient comes in with some shortness of breath reportedly greater than baseline and chronic dyspnea on exertion; not requiring O2  Chest x-ray clear  He is not been compliant with losartan due to reported cough  Episode of chest discomfort was with radiation from the abdomen that appeared nonanginal  Cardiac enzymes negative over multiple sets; doubt ACS  Not been taking anticoagulation due to lack of insurance; continue Eliquis for now  Social work to evaluate the feasibility of anticoagulation with either Eliquis or warfarin  Continue Toprol-XL 25 mg daily; may consider the initiation of digoxin for rate control and inotropic support tomorrow  Heart rates stable in atrial flutter  BP borderline in the 90s to 100s making it difficult to add ACE inhibitor/ARB; would not add ACE inhibitor with patient experiencing cough on losartan, but if BP improves may consider use of Diovan  Will likely be unable to obtain prescription for Jardiance as it is not generic with significant cost; recommend social work look at the cost for this patient regardless  If patient on anticoagulation at discharge, reasonable for discontinuation of aspirin therapy  Transition oral diuretic tomorrow; does not appear to be significantly overloaded  Outpatient follow-up with primary cardiologist at [address] [place] [address]    -    HPI: Catrina Shelley is a 75y.o. year old male with CAD with CABG in 2008, ischemic cardiomyopathy, combined systolic and diastolic heart failure, persistent atrial fibrillation, CKD, dyslipidemia, aortic stenosis and mitral regurgitation presented with sharp discomfort in the epigastrium radiating into the chest.  Symptoms occurred while he was cooking food for his grandchildren. The patient admitted to some associated shortness of breath. At baseline, he has had chronic dyspnea on exertion, but nothing significantly different than his baseline. He regularly climbs stairs but does not experience exertional chest discomfort. Due to his sharp pain and history of CAD, he presented to [address] [place] [address] for evaluation. In the emergency department, troponins were found to be negative over 2 sets. BNP was obtained and was found to be 1244 with a repeat obtained hours later at 593. The patient was given Lasix 80 mg IV x1. As an outpatient, patient follows with [address] [place] [address]. Due to being uninsured, he was previously unable to undergo ICD placement and had difficulty with obtaining Eliquis for his known atrial fibrillation.   Currently, he feels much improved after diuretic.    -    Review of Systems   Constitutional: Negative for decreased appetite, diaphoresis, fever, unexpected weight change, weight gain and weight loss. HENT: Negative for congestion, facial swelling and sore throat. Eyes: Negative. Negative for visual disturbance. Cardiovascular: Positive for chest pain, leg swelling and palpitations. Negative for dyspnea on exertion and near-syncope. LLE is slightly more edematous than the right   Respiratory: Positive for cough and shortness of breath. Endocrine: Negative. Hematologic/Lymphatic: Negative. Negative for bleeding problem. Skin: Negative for rash. Musculoskeletal: Negative. Negative for myalgias and neck pain. Gastrointestinal: Positive for abdominal pain and vomiting. Negative for constipation, diarrhea and nausea. Epigastric pain with one episode of vomiting in the hospital    Genitourinary: Negative. Neurological: Negative for dizziness, facial asymmetry, light-headedness, syncope and weakness. Psychiatric/Behavioral: Negative for depression. Historical Information   History reviewed. No pertinent past medical history. Past Surgical History:   Procedure Laterality Date   • CORONARY ARTERY BYPASS GRAFT       Social History     Substance and Sexual Activity   Alcohol Use Never     Social History     Substance and Sexual Activity   Drug Use Never     Social History     Tobacco Use   Smoking Status Never   Smokeless Tobacco Never       Family History: History reviewed. No pertinent family history. Meds/Allergies   all current active meds have been reviewed  No Known Allergies    Objective   Vitals: Blood pressure 114/84, pulse 94, temperature (!) 97.1 °F (36.2 °C), temperature source Temporal, resp. rate 20, height 5' 10" (1.778 m), weight 72 kg (158 lb 11.7 oz), SpO2 98 %. , Body mass index is 22.78 kg/m².,   Orthostatic Blood Pressures    Flowsheet Row Most Recent Value   Blood Pressure 114/84 filed at 08/03/2023 1058   Patient Position - Orthostatic VS Lying filed at 08/03/2023 7330          Systolic (73IOR), Av , Min:95 , RJK:460     Diastolic (62WMT), TKH:40, Min:65, Max:97        Intake/Output Summary (Last 24 hours) at 8/3/2023 1107  Last data filed at 8/3/2023 0454  Gross per 24 hour   Intake --   Output 1895 ml   Net -1895 ml       Invasive Devices     Peripheral Intravenous Line  Duration           Peripheral IV 23 Left Antecubital 2 days                  Physical Exam:  Gen: Awake, Alert, NAD  Head/eyes: AT/NC, pupils equal and round, Anicteric  ENT: mmm  Neck: Supple, +JVP, trachea midline  Resp: CTA bilaterally no w/r/r  CV: irreg irreg +S1, S2, No m/r/g  Abd: Soft, NT/ND + BS  Ext: no LE edema bilaterally  Neuro: Follows commands, moves all extermities  Psych: Appropriate affect, normal mood, pleasant attitude, non-combative  Skin: warm; no rash, erythema or venous stasis changes on exposed skin         EKG:       Imaging: I have personally reviewed pertinent reports. ECHO: 2023  •  Left Ventricle: Left ventricular cavity size is mildly dilated. Wall thickness is mildly increased. The left ventricular ejection fraction is 25%. Systolic function is severely reduced. Diastolic function is abnormal.  Left atrial filling pressure is elevated. •  The following segments are akinetic: basal inferior, basal anterolateral, mid anterolateral, apical septal, apical lateral and apex. •  The following segments are hypokinetic: basal anteroseptal, basal inferoseptal, basal inferolateral, mid anterior, mid anteroseptal, mid inferoseptal, mid inferior, mid inferolateral, apical anterior and apical inferior. •  Right Ventricle: Systolic function is mildly reduced. •  Left Atrium: The atrium is severely dilated (>48 mL/m2). •  Aortic Valve: The leaflets are severely calcified. There is severely reduced mobility. There is mild regurgitation. There is moderate to severe stenosis. The aortic valve peak velocity is 2.98 m/s. The aortic valve mean gradient is 21 mmHg. The aortic valve area is 1.05 cm2. The aortic valve velocity is increased due to stenosis but lower than expected due to the presence of decreased flow (decreased EF). •  Mitral Valve: There is severe regurgitation. •  Tricuspid Valve: There is moderate regurgitation. The right ventricular systolic pressure is moderately to severely elevated. The estimated right ventricular systolic pressure is 48.19 mmHg. •  Pulmonic Valve:  There is moderate regurgitation.       Lab Results:     Cardiac Profile:   Results from last 7 days   Lab Units 08/01/23 2230 08/01/23 2038   HS TNI 0HR ng/L  --  25   HS TNI 2HR ng/L 25  --    HSTNI D2 ng/L 0  --        CBC with diff:   Results from last 7 days   Lab Units 08/03/23 0450 08/02/23 0427 08/01/23 2038   WBC Thousand/uL 5.11 5.54 6.50   HEMOGLOBIN g/dL 13.8 13.1 13.0   HEMATOCRIT % 44.0 41.9 42.3   MCV fL 94 95 96   PLATELETS Thousands/uL 155 149 166   RBC Million/uL 4.68 4.41 4.39   MCH pg 29.5 29.7 29.6   MCHC g/dL 31.4 31.3* 30.7*   RDW % 14.6 14.5 14.6   MPV fL 12.5 12.8* 12.9*   NRBC AUTO /100 WBCs 0 0 0       CMP:   Results from last 7 days   Lab Units 08/03/23 0450 08/02/23 0427 08/01/23 2038   POTASSIUM mmol/L 3.8 4.4 4.2   CHLORIDE mmol/L 103 102 103   CO2 mmol/L 31 29 26   BUN mg/dL 23 25 24   CREATININE mg/dL 1.26 1.40* 1.47*   CALCIUM mg/dL 8.8 9.1 8.7   AST U/L  --   --  37   ALT U/L  --   --  31   ALK PHOS U/L  --   --  83   EGFR ml/min/1.73sq m 58 51 48         Signed: Allison Gee DO, FACC, BARBIE, FACP

## 2023-08-03 NOTE — ASSESSMENT & PLAN NOTE
• Patient with moderate right and small left pleural effusions, in the setting of interstitial edema, most likely secondary to CHF  • Monitor with diuresis  • No current respiratory distress or indication for urgent thoracentesis  • Repeat imagining today

## 2023-08-03 NOTE — ASSESSMENT & PLAN NOTE
Case management consultation: Patient's [facility] cardiologist is coordinating with [facility] population health department to assist patient with obtaining insurance as he currently does not have a Social Security card, or access to insurance

## 2023-08-03 NOTE — PLAN OF CARE
Problem: PAIN - ADULT  Goal: Verbalizes/displays adequate comfort level or baseline comfort level  Description: Interventions:  - Encourage patient to monitor pain and request assistance  - Assess pain using appropriate pain scale  - Administer analgesics based on type and severity of pain and evaluate response  - Implement non-pharmacological measures as appropriate and evaluate response  - Consider cultural and social influences on pain and pain management  - Notify physician/advanced practitioner if interventions unsuccessful or patient reports new pain  Outcome: Progressing     Problem: INFECTION - ADULT  Goal: Absence or prevention of progression during hospitalization  Description: INTERVENTIONS:  - Assess and monitor for signs and symptoms of infection  - Monitor lab/diagnostic results  - Monitor all insertion sites, i.e. indwelling lines, tubes, and drains  - Monitor endotracheal if appropriate and nasal secretions for changes in amount and color  - [place] appropriate cooling/warming therapies per order  - Administer medications as ordered  - Instruct and encourage patient and family to use good hand hygiene technique  - Identify and instruct in appropriate isolation precautions for identified infection/condition  Outcome: Progressing  Goal: Absence of fever/infection during neutropenic period  Description: INTERVENTIONS:  - Monitor WBC    Outcome: Progressing     Problem: SAFETY ADULT  Goal: Patient will remain free of falls  Description: INTERVENTIONS:  - Educate patient/family on patient safety including physical limitations  - Instruct patient to call for assistance with activity   - Consult OT/PT to assist with strengthening/mobility   - Keep Call bell within reach  - Keep bed low and locked with side rails adjusted as appropriate  - Keep care items and personal belongings within reach  - Initiate and maintain comfort rounds  - Make Fall Risk Sign visible to staff  - Offer Toileting every 2 Hours, in advance of need  - Initiate/Maintain bed alarm  - Obtain necessary fall risk management equipment  - Apply yellow socks and bracelet for high fall risk patients  - Consider moving patient to room near nurses station  Outcome: Progressing  Goal: Maintain or return to baseline ADL function  Description: INTERVENTIONS:  -  Assess patient's ability to carry out ADLs; assess patient's baseline for ADL function and identify physical deficits which impact ability to perform ADLs (bathing, care of mouth/teeth, toileting, grooming, dressing, etc.)  - Assess/evaluate cause of self-care deficits   - Assess range of motion  - Assess patient's mobility; develop plan if impaired  - Assess patient's need for assistive devices and provide as appropriate  - Encourage maximum independence but intervene and supervise when necessary  - Involve family in performance of ADLs  - Assess for home care needs following discharge   - Consider OT consult to assist with ADL evaluation and planning for discharge  - Provide patient education as appropriate  Outcome: Progressing  Goal: Maintains/Returns to pre admission functional level  Description: INTERVENTIONS:  - Perform BMAT or MOVE assessment daily.   - Set and communicate daily mobility goal to care team and patient/family/caregiver. - Collaborate with rehabilitation services on mobility goals if consulted  - Perform Range of Motion 3 times a day. - Reposition patient every 2 hours.   - Dangle patient 3 times a day  - Stand patient 3 times a day  - Ambulate patient 3 times a day  - Out of bed to chair 3 times a day   - Out of bed for meals 3 times a day  - Out of bed for toileting  - Record patient progress and toleration of activity level   Outcome: Progressing     Problem: DISCHARGE PLANNING  Goal: Discharge to home or other facility with appropriate resources  Description: INTERVENTIONS:  - Identify barriers to discharge w/patient and caregiver  - Arrange for needed discharge resources and transportation as appropriate  - Identify discharge learning needs (meds, wound care, etc.)  - Arrange for interpretive services to assist at discharge as needed  - Refer to Case Management Department for coordinating discharge planning if the patient needs post-hospital services based on physician/advanced practitioner order or complex needs related to functional status, cognitive ability, or social support system  Outcome: Progressing     Problem: Knowledge Deficit  Goal: Patient/family/caregiver demonstrates understanding of disease process, treatment plan, medications, and discharge instructions  Description: Complete learning assessment and assess knowledge base.   Interventions:  - Provide teaching at level of understanding  - Provide teaching via preferred learning methods  Outcome: Progressing     Problem: CARDIOVASCULAR - ADULT  Goal: Maintains optimal cardiac output and hemodynamic stability  Description: INTERVENTIONS:  - Monitor I/O, vital signs and rhythm  - Monitor for S/S and trends of decreased cardiac output  - Administer and titrate ordered vasoactive medications to optimize hemodynamic stability  - Assess quality of pulses, skin color and temperature  - Assess for signs of decreased coronary artery perfusion  - Instruct patient to report change in severity of symptoms  Outcome: Progressing  Goal: Absence of cardiac dysrhythmias or at baseline rhythm  Description: INTERVENTIONS:  - Continuous cardiac monitoring, vital signs, obtain 12 lead EKG if ordered  - Administer antiarrhythmic and heart rate control medications as ordered  - Monitor electrolytes and administer replacement therapy as ordered  Outcome: Progressing     Problem: MOBILITY - ADULT  Goal: Maintain or return to baseline ADL function  Description: INTERVENTIONS:  -  Assess patient's ability to carry out ADLs; assess patient's baseline for ADL function and identify physical deficits which impact ability to perform ADLs (bathing, care of mouth/teeth, toileting, grooming, dressing, etc.)  - Assess/evaluate cause of self-care deficits   - Assess range of motion  - Assess patient's mobility; develop plan if impaired  - Assess patient's need for assistive devices and provide as appropriate  - Encourage maximum independence but intervene and supervise when necessary  - Involve family in performance of ADLs  - Assess for home care needs following discharge   - Consider OT consult to assist with ADL evaluation and planning for discharge  - Provide patient education as appropriate  Outcome: Progressing  Goal: Maintains/Returns to pre admission functional level  Description: INTERVENTIONS:  - Perform BMAT or MOVE assessment daily.   - Set and communicate daily mobility goal to care team and patient/family/caregiver. - Collaborate with rehabilitation services on mobility goals if consulted  - Perform Range of Motion 3 times a day. - Reposition patient every 2 hours.   - Dangle patient 3 times a day  - Stand patient 3 times a day  - Ambulate patient 3 times a day  - Out of bed to chair 3 times a day   - Out of bed for meals 3 times a day  - Out of bed for toileting  - Record patient progress and toleration of activity level   Outcome: Progressing     Problem: GASTROINTESTINAL - ADULT  Goal: Maintains or returns to baseline bowel function  Description: INTERVENTIONS:  - Assess bowel function  - Encourage oral fluids to ensure adequate hydration  - Administer IV fluids if ordered to ensure adequate hydration  - Administer ordered medications as needed  - Encourage mobilization and activity  - Consider nutritional services referral to assist patient with adequate nutrition and appropriate food choices  Outcome: Progressing     Problem: METABOLIC, FLUID AND ELECTROLYTES - ADULT  Goal: Fluid balance maintained  Description: INTERVENTIONS:  - Monitor labs   - Monitor I/O and WT  - Instruct patient on fluid and nutrition as appropriate  - Assess for signs & symptoms of volume excess or deficit  Outcome: Progressing

## 2023-08-03 NOTE — ASSESSMENT & PLAN NOTE
· Patient with a history of moderate aortic stenosis on prior echocardiograms  · Repeat echo shows moderate to severe aortic stenosis

## 2023-08-03 NOTE — ASSESSMENT & PLAN NOTE
Lab Results   Component Value Date    EGFR 58 08/03/2023    EGFR 51 08/02/2023    EGFR 48 08/01/2023    CREATININE 1.26 08/03/2023    CREATININE 1.40 (H) 08/02/2023    CREATININE 1.47 (H) 08/01/2023     · Baseline Cr: 1.0-1.4  • Creatinine currently at his baseline: Monitor with diuresis  • Monitor closely after patient received IV contrast with CAT scan in the ER

## 2023-08-04 LAB
ANION GAP SERPL CALCULATED.3IONS-SCNC: 4 MMOL/L
BUN SERPL-MCNC: 20 MG/DL (ref 5–25)
CALCIUM SERPL-MCNC: 8.8 MG/DL (ref 8.4–10.2)
CHLORIDE SERPL-SCNC: 103 MMOL/L (ref 96–108)
CO2 SERPL-SCNC: 32 MMOL/L (ref 21–32)
CREAT SERPL-MCNC: 1.19 MG/DL (ref 0.6–1.3)
GFR SERPL CREATININE-BSD FRML MDRD: 62 ML/MIN/1.73SQ M
GLUCOSE SERPL-MCNC: 91 MG/DL (ref 65–140)
POTASSIUM SERPL-SCNC: 4.2 MMOL/L (ref 3.5–5.3)
SODIUM SERPL-SCNC: 139 MMOL/L (ref 135–147)

## 2023-08-04 PROCEDURE — 99232 SBSQ HOSP IP/OBS MODERATE 35: CPT | Performed by: INTERNAL MEDICINE

## 2023-08-04 PROCEDURE — 99232 SBSQ HOSP IP/OBS MODERATE 35: CPT

## 2023-08-04 PROCEDURE — 80048 BASIC METABOLIC PNL TOTAL CA: CPT | Performed by: INTERNAL MEDICINE

## 2023-08-04 RX ORDER — FUROSEMIDE 40 MG/1
40 TABLET ORAL DAILY
Status: DISCONTINUED | OUTPATIENT
Start: 2023-08-04 | End: 2023-08-05 | Stop reason: HOSPADM

## 2023-08-04 RX ADMIN — POLYETHYLENE GLYCOL 3350 17 G: 17 POWDER, FOR SOLUTION ORAL at 09:12

## 2023-08-04 RX ADMIN — ASPIRIN 81 MG: 81 TABLET, COATED ORAL at 09:12

## 2023-08-04 RX ADMIN — APIXABAN 5 MG: 5 TABLET, FILM COATED ORAL at 17:46

## 2023-08-04 RX ADMIN — APIXABAN 5 MG: 5 TABLET, FILM COATED ORAL at 09:12

## 2023-08-04 RX ADMIN — ATORVASTATIN CALCIUM 40 MG: 80 TABLET, FILM COATED ORAL at 15:46

## 2023-08-04 RX ADMIN — EMPAGLIFLOZIN 10 MG: 10 TABLET, FILM COATED ORAL at 14:11

## 2023-08-04 NOTE — ASSESSMENT & PLAN NOTE
· Patient has a prior history of atrial flutter, status post ablation  · Current history of paroxysmal atrial fibrillation  · Patient was previously on Coumadin however was discharged from the [facility] Coumadin clinic due to nonadherence- he again admits that he is not happy with utilizing coumadin   · Had been prescribed Eliquis as an outpatient however unable to afford out-of-pocket cost: [facility] cardiology working with [facility] to obtain insurance for patient- currently coordinating with case management here. · Currently rate controlled on home dose of metoprolol XL 50 mg twice daily- however due to lower bp will decrease to 25mg bid. · Had been restarted on Eliquis on admission:  We will coordinate with case management regarding availability of program supplying Eliquis at discharge  · Cardiology discussed the initiation of digoxin if rate control or bp issues

## 2023-08-04 NOTE — PROGRESS NOTES
[address] [facility]  Progress Note  Name: Fernando Neely  MRN: [id number]  Unit/Bed#: E4 -01 I Date of Admission: 8/1/2023   Date of Service: 8/4/2023 I Hospital Day: 2    Assessment/Plan   * Acute on chronic combined systolic and diastolic congestive heart failure [facility])  Assessment & Plan  Patient is a 50-year-old male with past medical history significant for coronary artery disease status post CABG, and ischemic cardiomyopathy with an ejection fraction in the 25s, as well as paroxysmal atrial fibrillation, not on anticoagulation, who presented with worsening dyspnea on exertion, orthopnea    · Patient has a history of chronic combined systolic and diastolic congestive heart failure and follows with [facility] cardiology: Diagnosed with ischemic cardiomyopathy  · Currently on Lasix 40 mg daily outpt, and notes he is compliant with this medication  · Patient presented with volume overload, dyspnea on exertion and orthopnea, interstitial edema on CAT scan, and BNP of 1244  · Diuresed with 80 mg IV Lasix in the ER and started on 50 mg IV twice daily- however missed a dose due to sbp 97. · Weight down from 166 pounds to 148 pounds- however there is question of accuracy of original weight from ed. · Diuresed 3 liters   · D/c losartan as pt had stopped this outpt due to cough. · Appreciate cardiology recommendations. · cxr shows resolution of pleural effusions   · bnp down to 500  · Further IV lasix were held  · Possibly restart po lasix today   · Outpatient cardiology office records note he was not able to receive ICD due to lack of insurance coverage: Their office is coordinating with [employer] to attempt to arrange insurance  · Repeat 2D echo: Ef 25%. Moderate to severe aortic stenosis. · Case management looking into affordability of medications and insurance issues.        Chest pain  Assessment & Plan  · Pt notes a several week h/o chest pain, starting in abdomen and radiating up the chest.  Denies exacerbating or alleviating factors. Has been constantly present for weeks. · EKG with inferior lateral T wave inversions, however these are not new, when compared with previous EKG history at [facility], seen 7/2. · Patient's troponins unremarkable. No evidence of acute ischemia. · Continue medical management with aspirin, beta-blocker, statin    Pleural effusion, bilateral  Assessment & Plan  • Patient with moderate right and small left pleural effusions, in the setting of interstitial edema, most likely secondary to CHF  • Monitor with diuresis  • No current respiratory distress or indication for urgent thoracentesis  • Repeat cxr shows resolution       Prediabetes  Assessment & Plan  Last A1c 6.3  He would benefit from jardiance due to chf. However unable currently does not have insurance and concerned about affordability     Uninsured  Assessment & Plan  Case management consultation: Patient's [facility] cardiologist is coordinating with [facility] population health department to assist patient with obtaining insurance as he currently does not have a Social Security card, or access to insurance    Atrial fibrillation [facility])  Assessment & Plan  · Patient has a prior history of atrial flutter, status post ablation  · Current history of paroxysmal atrial fibrillation  · Patient was previously on Coumadin however was discharged from the [facility] Coumadin clinic due to nonadherence- he again admits that he is not happy with utilizing coumadin   · Had been prescribed Eliquis as an outpatient however unable to afford out-of-pocket cost: [facility] cardiology working with population health to obtain insurance for patient- currently coordinating with case management here. · Currently rate controlled on home dose of metoprolol XL 50 mg twice daily- however due to lower bp will decrease to 25mg bid. · Had been restarted on Eliquis on admission: We will coordinate with case management regarding availability of program supplying Eliquis at discharge  · Cardiology discussed the initiation of digoxin if rate control or bp issues       Nonrheumatic aortic valve insufficiency  Assessment & Plan  · Patient with a history of moderate aortic stenosis on prior echocardiograms  · Repeat echo shows moderate to severe aortic stenosis    Coronary artery disease involving native coronary artery of native heart without angina pectoris  Assessment & Plan  • Patient has a history of coronary artery disease status post CABG in 2008  • Catheterization in 2020 with patent LIMA-LAD, and mild disease of SVG-OM. Native left circumflex with diffuse disease, RCA with chronic total occlusion however collateral flow  • Continue home aspirin, beta-blocker, statin, ARB      CKD (chronic kidney disease), stage III [facility])  Assessment & Plan  Lab Results   Component Value Date    EGFR 62 08/04/2023    EGFR 58 08/03/2023    EGFR 51 08/02/2023    CREATININE 1.19 08/04/2023    CREATININE 1.26 08/03/2023    CREATININE 1.40 (H) 08/02/2023     · Baseline Cr: 1.0-1.4  • Creatinine currently at his baseline: Monitor with diuresis  • Monitor closely after patient received IV contrast with CAT scan in the ER    Cardiomyopathy, ischemic  Assessment & Plan  • Hx of CAD, CABG x2, last EF 25% on repeat echo   • Last cardiology note reports he is not a [place] citizen/no insurance. [facility] was involved, he previously was unable to afford 2D echocardiograms/ICD implantation  • Holding arb due during diuresis and history of cough.  Continue metoprolol, asa, and statin              VTE Pharmacologic Prophylaxis: eliquis   Mechanical VTE Prophylaxis in Place: Yes    Education and Discussions with Family / Patient: patient    Current Length of Stay: 2 day(s)  Current Patient Status: Inpatient     Discharge Plan / Estimated Discharge Date:awaiting cardiology clearance and case management follow up due to affordability of medications. Code Status: Level 1 - Full Code      Subjective:   Pt seen and examined. He denies further dyspnea with exertion. He reports he had been on coumadin at one point but had a difficult time with it due to multiple blood draws. No f/c no cp no n/v/d no abd pain     Objective:     Vitals:   Temp (24hrs), Av.4 °F (36.3 °C), Min:97.1 °F (36.2 °C), Max:97.9 °F (36.6 °C)    Temp:  [97.1 °F (36.2 °C)-97.9 °F (36.6 °C)] 97.4 °F (36.3 °C)  HR:  [] 61  Resp:  [14-20] 14  BP: (101-130)/(68-91) 103/68  SpO2:  [95 %-99 %] 95 %  Body mass index is 21.36 kg/m². Input and Output Summary (last 24 hours): Intake/Output Summary (Last 24 hours) at 2023 0823  Last data filed at 8/3/2023 2332  Gross per 24 hour   Intake 580 ml   Output 2300 ml   Net -1720 ml       Physical Exam:   Physical Exam  Constitutional:       Appearance: Normal appearance. HENT:      Head: Normocephalic and atraumatic. Eyes:      Extraocular Movements: Extraocular movements intact. Pupils: Pupils are equal, round, and reactive to light. Cardiovascular:      Rate and Rhythm: Normal rate and regular rhythm. Heart sounds: No murmur heard. No friction rub. No gallop. Pulmonary:      Effort: Pulmonary effort is normal. No respiratory distress. Breath sounds: Normal breath sounds. No wheezing, rhonchi or rales. Abdominal:      General: Bowel sounds are normal. There is no distension. Palpations: Abdomen is soft. Tenderness: There is no abdominal tenderness. There is no guarding or rebound. Musculoskeletal:      Right lower leg: No edema. Left lower leg: No edema. Neurological:      Mental Status: He is alert and oriented to person, place, and time.           Additional Data:     Labs:  Results from last 7 days   Lab Units 23  0450   WBC Thousand/uL 5.11   HEMOGLOBIN g/dL 13.8   HEMATOCRIT % 44.0 PLATELETS Thousands/uL 155   NEUTROS PCT % 50   LYMPHS PCT % 32   MONOS PCT % 12   EOS PCT % 5     Results from last 7 days   Lab Units 08/04/23  0536 08/02/23  0427 08/01/23 2038   SODIUM mmol/L 139   < > 135   POTASSIUM mmol/L 4.2   < > 4.2   CHLORIDE mmol/L 103   < > 103   CO2 mmol/L 32   < > 26   BUN mg/dL 20   < > 24   CREATININE mg/dL 1.19   < > 1.47*   ANION GAP mmol/L 4   < > 6   CALCIUM mg/dL 8.8   < > 8.7   ALBUMIN g/dL  --   --  3.7   TOTAL BILIRUBIN mg/dL  --   --  1.24*   ALK PHOS U/L  --   --  83   ALT U/L  --   --  31   AST U/L  --   --  37   GLUCOSE RANDOM mg/dL 91   < > 140    < > = values in this interval not displayed.      Results from last 7 days   Lab Units 08/01/23 2038   INR  1.19                   Recent Cultures (last 7 days):           Lines/Drains:  Invasive Devices     Peripheral Intravenous Line  Duration           Peripheral IV 08/01/23 Left Antecubital 3 days                Telemetry:   Telemetry Orders (From admission, onward)             24 Hour Telemetry Monitoring  Continuous x 24 Hours (Telem)        Question:  Reason for 24 Hour Telemetry  Answer:  Decompensated CHF- and any one of the following: continuous diuretic infusion or total diuretic dose >200 mg daily, associated electrolyte derangement (I.e. K < 3.0), ionotropic drip (continuous infusion), hx of ventricular arrhythmia, or new EF < 35%                    Last 24 Hours Medication List:   Current Facility-Administered Medications   Medication Dose Route Frequency Provider Last Rate   • acetaminophen  650 mg Oral Q4H PRN Criselda Rivera PA-C     • aluminum-magnesium hydroxide-simethicone  30 mL Oral Q6H PRN Criselda Rivera PA-C     • apixaban  5 mg Oral BID Cirselda Rivera PA-C     • aspirin  81 mg Oral Daily Criselda Rivera PA-C     • atorvastatin  40 mg Oral Daily With Merary Escalona PA-C     • metoprolol succinate  25 mg Oral BID Ana M Mo DO     • ondansetron  4 mg Intravenous Q4H PRN Bo Garcia MD     • oxyCODONE  2.5 mg Oral Q4H PRN Antonio Mays PA-C     • perflutren lipid microsphere  0.4 mL/min Intravenous Once in imaging Sinan Campuzano MD     • polyethylene glycol  17 g Oral Daily Antonio Mays PA-C          Today, Patient Was Seen By: Gunner Blankenship DO

## 2023-08-04 NOTE — ASSESSMENT & PLAN NOTE
Case management consultation: Patient's [facility] cardiologist is coordinating with [facility] population health department to assist patient with obtaining insurance as he currently does not have a Social Security card, or access to insurance

## 2023-08-04 NOTE — ASSESSMENT & PLAN NOTE
Patient is a 74-year-old male with past medical history significant for coronary artery disease status post CABG, and ischemic cardiomyopathy with an ejection fraction in the 20s, as well as paroxysmal atrial fibrillation, not on anticoagulation, who presented with worsening dyspnea on exertion, orthopnea    · Patient has a history of chronic combined systolic and diastolic congestive heart failure and follows with [facility] cardiology: Diagnosed with ischemic cardiomyopathy  · Currently on Lasix 40 mg daily outpt, and notes he is compliant with this medication  · Patient presented with volume overload, dyspnea on exertion and orthopnea, interstitial edema on CAT scan, and BNP of 1244  · Diuresed with 80 mg IV Lasix in the ER and started on 50 mg IV twice daily- however missed a dose due to sbp 97. · Weight down from 166 pounds to 148 pounds- however there is question of accuracy of original weight from ed. · Diuresed 3 liters   · D/c losartan as pt had stopped this outpt due to cough. · Appreciate cardiology recommendations. · cxr shows resolution of pleural effusions   · bnp down to 500  · Further IV lasix were held  · Possibly restart po lasix today   · Outpatient cardiology office records note he was not able to receive ICD due to lack of insurance coverage: Their office is coordinating with "[employer] to attempt to arrange insurance  · Repeat 2D echo: Ef 25%. Moderate to severe aortic stenosis. · Case management looking into affordability of medications and insurance issues.

## 2023-08-04 NOTE — PROGRESS NOTES
Cardiology - Progress Note  Daniel Milligan y.o. male MRN: [id number]  Unit/Bed#: E4 -01 Encounter: [id number]  08/04/23  9:39 AM  /  Encounter date: 08/04/23  Patient name: Daniel Milligan y.o. male  Medical Student: Fidel Shepherd   Attending Physician: Dr Papi Duggan attending physician: Deepa Sherman DO  Primary care provider: No primary care provider on file. Assessment/ Plan:  Chronic combined systolic and diastolic heart failure   - LVEF 25%, mild LA dilatation, diastolic dysfunction with elevated LAP, RWMAs c/w CAD, mildly reduced RV function, severe LA dilatation, moderate to severe AS, severe MR, moderate TR/NY with PASP 64 mmHg, August 2023  -continue metoprolol 25 mg daily  -does not appear to be overloaded at this time   -chest x-ray shows improved pleural effusions    Plan:   - continue metoprolol 25 mg daily   -transition to oral diuretic  -outpatient follow-up with primary cardiologist   -Would be okay for DC today    Chest pain  -  Cardiac enzymes negative ruled out ACS     Persistent Atrial Flutter  - Pt was not on anticoagulation therapy before admission, was given eliquis inpatient   - Plan:  -Social work was able to get eliquis covered for the pt outpt, continue medication upon d/c    CAD  ? - s/p CABG w/ LIMA-LAD and SVG-OM, 2008  s/p cath w/ patent LIMA-LAD and mild disease of SVG-OM, diffuse m-dLCx disease and -RCA w/ collaterals from septal perforators, 2020    -follow up with cardiologist outpatient     Ischemic cardiomyopathy   - pt was told he was a candidate for ICD several years ago, did not obtain at that time due to insurance issues  Plan:   -pt is still a candidate for the ICD however insurance is a barrier to get this during this hospital visit   -follow up outpt with primary cardiologist, social work assisted in obtaining insurance. Valvular heart disease:    Moderate to severe aortic stenosis  w/ Vmax 2.98 m/s, mean PG 21 mmHg and ROSEMARIE 1.05 cm2, August 2023   Severe mitral regurgitation  -Continue current medications for ischemic cardiomyopathy/heart failure and plan for follow-up with primary cardiologist.  Consider evaluation for transcatheter aortic valve replacement as outpatient    CKD  Dyslipidemia    HPI: Today, Rober Jones is no longer complaining of chest pain and reports feeling better than yesterday. He reports improvement of SOB, and is currently not requiring O2, and is back to his baseline. He denies headache, dizziness, N/V/D. Objective   Vitals: Blood pressure 92/70, pulse 93, temperature (!) 96.2 °F (35.7 °C), temperature source Temporal, resp. rate (!) 24, height 5' 10" (1.778 m), weight 67.5 kg (148 lb 13.3 oz), SpO2 98 %. , Body mass index is 21.36 kg/m².,   Orthostatic Blood Pressures    Flowsheet Row Most Recent Value   Blood Pressure 92/70 filed at 08/04/2023 0834   Patient Position - Orthostatic VS Lying filed at 08/04/2023 4767          Systolic (38KKB), PJR:079 , Min:92 , SDV:352     Diastolic (62TSY), RZO:72, Min:68, Max:91        Intake/Output Summary (Last 24 hours) at 8/4/2023 4923  Last data filed at 8/3/2023 2332  Gross per 24 hour   Intake 580 ml   Output 2300 ml   Net -1720 ml       Invasive Devices     Peripheral Intravenous Line  Duration           Peripheral IV 08/01/23 Left Antecubital 3 days                  Physical Exam:  Physical Exam  Constitutional:       Appearance: Normal appearance. HENT:      Head: Normocephalic. Cardiovascular:      Rate and Rhythm: Normal rate. Rhythm irregular. Musculoskeletal:      Cervical back: No rigidity or tenderness. Right lower leg: No edema. Left lower leg: No edema. Neurological:      General: No focal deficit present. Mental Status: He is alert and oriented to person, place, and time.    Psychiatric:         Mood and Affect: Mood normal.        Constitutional: awake, alert and oriented, in no acute distress, no obvious deformities  Head: Normocephalic, without obvious abnormality, atraumatic  Eyes: conjunctivae clear and moist. Sclera anicteric. No xanthelasmas. Pupils equal bilaterally. Extraocular motions are full. Ear nose mouth and throat: ears are symmetrical bilaterally, hearing appears to be equal bilaterally, no nasal discharge or epistaxis, oropharynx is clear with moist mucous membranes  Neck:  Trachea is midline, neck is supple, no thyromegaly or significant lymphadenopathy, there is full range of motion. Lungs: clear to auscultation bilaterally, no wheezes, no rales, no rhonchi, no accessory muscle use, breathing is nonlabored  Heart: regular rate and rhythm, S1, S2 normal, no murmur, no click, rub or gallop, no lower extremity edema  Abdomen: soft, non-tender; bowel sounds normal; no masses,  no organomegaly  Psychiatric:  Patient is oriented to time, place, person, mood/affect is negative for depression, anxiety, agitation, appears to have appropriate insight  Skin: Skin is warm, dry, intact. No obvious rashes or lesions on exposed extremities. Nail beds are pink with no cyanosis or clubbing.     Lab Results:       CBC with diff:   Results from last 7 days   Lab Units 08/03/23  0450 08/02/23 0427 08/01/23 2038   WBC Thousand/uL 5.11 5.54 6.50   HEMOGLOBIN g/dL 13.8 13.1 13.0   HEMATOCRIT % 44.0 41.9 42.3   MCV fL 94 95 96   PLATELETS Thousands/uL 155 149 166   RBC Million/uL 4.68 4.41 4.39   MCH pg 29.5 29.7 29.6   MCHC g/dL 31.4 31.3* 30.7*   RDW % 14.6 14.5 14.6   MPV fL 12.5 12.8* 12.9*   NRBC AUTO /100 WBCs 0 0 0       CMP:   Results from last 7 days   Lab Units 08/04/23  0536 08/03/23  0450 08/02/23 0427 08/01/23 2038   POTASSIUM mmol/L 4.2 3.8 4.4 4.2   CHLORIDE mmol/L 103 103 102 103   CO2 mmol/L 32 31 29 26   BUN mg/dL 20 23 25 24   CREATININE mg/dL 1.19 1.26 1.40* 1.47*   CALCIUM mg/dL 8.8 8.8 9.1 8.7   AST U/L  --   --   --  37   ALT U/L  --   --   --  31   ALK PHOS U/L  --   --   --  83   EGFR ml/min/1.73sq m 62 58 51 48     Medical student  Samantha Childs   [facility]    Attending cardiologist:  Albert Martinez DO, [facility], [facility]  Cardiovascular diseases

## 2023-08-04 NOTE — ASSESSMENT & PLAN NOTE
Lab Results   Component Value Date    EGFR 62 08/04/2023    EGFR 58 08/03/2023    EGFR 51 08/02/2023    CREATININE 1.19 08/04/2023    CREATININE 1.26 08/03/2023    CREATININE 1.40 (H) 08/02/2023     · Baseline Cr: 1.0-1.4  • Creatinine currently at his baseline: Monitor with diuresis  • Monitor closely after patient received IV contrast with CAT scan in the ER

## 2023-08-04 NOTE — CASE MANAGEMENT
Case Management Discharge Planning Note    Patient name Nieves Cost  Location [facility] [address] [facility] [address] MRN [id number]  : 1955 Date 2023       Current Admission Date: 2023  Current Admission Diagnosis:Acute on chronic combined systolic and diastolic congestive heart failure [facility])   Patient Active Problem List    Diagnosis Date Noted   • Prediabetes 2023   • Pleural effusion, bilateral 2023   • Chest pain 2023   • Cardiomyopathy, ischemic 2023   • Uninsured 2020   • Coronary artery disease involving native coronary artery of native heart without angina pectoris 2016   • Subclinical hypothyroidism 2016   • Acute on chronic combined systolic and diastolic congestive heart failure ([address]) 2016   • CKD (chronic kidney disease), stage III ([address]) 2016   • Nonrheumatic aortic valve insufficiency 2016   • Long term (current) use of anticoagulants 2016   • Atrial fibrillation ([address]) 2016      LOS (days): 2  Geometric Mean LOS (GMLOS) (days): 3.00  Days to GMLOS:0.7     OBJECTIVE:  Risk of Unplanned Readmission Score: 8.34         Current admission status: Inpatient   Preferred Pharmacy:   PATIENT/FAMILY REPORTS NO PREFERRED PHARMACY  No address on file      [address] [place] [address],  [address] [place] [address],  [address] [place] [address]  Phone: [phone] Fax: [phone]    Primary Care Provider: No primary care provider on file. Primary Insurance: [place] [employer]  Secondary Insurance:     DISCHARGE DETAILS:                                          Other Referral/Resources/Interventions Provided:  Interventions: Other (Specify) (Medications)  Referral Comments: Pt's d/c meds sent to [address] [facility] [address] for price check - Jardiance and Eliquis. Pt will have $0 co-pay for this Rx fill.  Dr. Pineda Witt aware of same via TT.

## 2023-08-04 NOTE — ASSESSMENT & PLAN NOTE
• Patient with moderate right and small left pleural effusions, in the setting of interstitial edema, most likely secondary to CHF  • Monitor with diuresis  • No current respiratory distress or indication for urgent thoracentesis  • Repeat cxr shows resolution

## 2023-08-04 NOTE — ASSESSMENT & PLAN NOTE
· Pt notes a several week h/o chest pain, starting in abdomen and radiating up the chest.  Denies exacerbating or alleviating factors. Has been constantly present for weeks. · EKG with inferior lateral T wave inversions, however these are not new, when compared with previous EKG history at [facility], seen 7/2. · Patient's troponins unremarkable. No evidence of acute ischemia.     · Continue medical management with aspirin, beta-blocker, statin

## 2023-08-04 NOTE — PLAN OF CARE
Problem: INFECTION - ADULT  Goal: Absence or prevention of progression during hospitalization  Description: INTERVENTIONS:  - Assess and monitor for signs and symptoms of infection  - Monitor lab/diagnostic results  - Monitor all insertion sites, i.e. indwelling lines, tubes, and drains  - Monitor endotracheal if appropriate and nasal secretions for changes in amount and color  - [place] appropriate cooling/warming therapies per order  - Administer medications as ordered  - Instruct and encourage patient and family to use good hand hygiene technique  - Identify and instruct in appropriate isolation precautions for identified infection/condition  Outcome: Progressing  Goal: Absence of fever/infection during neutropenic period  Description: INTERVENTIONS:  - Monitor WBC    Outcome: Progressing

## 2023-08-04 NOTE — ASSESSMENT & PLAN NOTE
• Hx of CAD, CABG x2, last EF 25% on repeat echo   • Last cardiology note reports he is not a [place] citizen/no insurance. [facility] was involved, he previously was unable to afford 2D echocardiograms/ICD implantation  • Holding arb due during diuresis and history of cough.  Continue metoprolol, asa, and statin

## 2023-08-04 NOTE — PLAN OF CARE
Problem: PAIN - ADULT  Goal: Verbalizes/displays adequate comfort level or baseline comfort level  Description: Interventions:  - Encourage patient to monitor pain and request assistance  - Assess pain using appropriate pain scale  - Administer analgesics based on type and severity of pain and evaluate response  - Implement non-pharmacological measures as appropriate and evaluate response  - Consider cultural and social influences on pain and pain management  - Notify physician/advanced practitioner if interventions unsuccessful or patient reports new pain  Outcome: Progressing     Problem: INFECTION - ADULT  Goal: Absence or prevention of progression during hospitalization  Description: INTERVENTIONS:  - Assess and monitor for signs and symptoms of infection  - Monitor lab/diagnostic results  - Monitor all insertion sites, i.e. indwelling lines, tubes, and drains  - Monitor endotracheal if appropriate and nasal secretions for changes in amount and color  - [place] appropriate cooling/warming therapies per order  - Administer medications as ordered  - Instruct and encourage patient and family to use good hand hygiene technique  - Identify and instruct in appropriate isolation precautions for identified infection/condition  Outcome: Progressing  Goal: Absence of fever/infection during neutropenic period  Description: INTERVENTIONS:  - Monitor WBC    Outcome: Progressing     Problem: SAFETY ADULT  Goal: Patient will remain free of falls  Description: INTERVENTIONS:  - Educate patient/family on patient safety including physical limitations  - Instruct patient to call for assistance with activity   - Consult OT/PT to assist with strengthening/mobility   - Keep Call bell within reach  - Keep bed low and locked with side rails adjusted as appropriate  - Keep care items and personal belongings within reach  - Initiate and maintain comfort rounds  - Make Fall Risk Sign visible to staff  - Offer Toileting every 2 Hours, in advance of need  - Initiate/Maintain bed alarm  - Obtain necessary fall risk management equipment: alarm  - Apply yellow socks and bracelet for high fall risk patients  - Consider moving patient to room near nurses station  Outcome: Progressing  Goal: Maintain or return to baseline ADL function  Description: INTERVENTIONS:  -  Assess patient's ability to carry out ADLs; assess patient's baseline for ADL function and identify physical deficits which impact ability to perform ADLs (bathing, care of mouth/teeth, toileting, grooming, dressing, etc.)  - Assess/evaluate cause of self-care deficits   - Assess range of motion  - Assess patient's mobility; develop plan if impaired  - Assess patient's need for assistive devices and provide as appropriate  - Encourage maximum independence but intervene and supervise when necessary  - Involve family in performance of ADLs  - Assess for home care needs following discharge   - Consider OT consult to assist with ADL evaluation and planning for discharge  - Provide patient education as appropriate  Outcome: Progressing  Goal: Maintains/Returns to pre admission functional level  Description: INTERVENTIONS:  - Perform BMAT or MOVE assessment daily.   - Set and communicate daily mobility goal to care team and patient/family/caregiver. - Collaborate with rehabilitation services on mobility goals if consulted  - Perform Range of Motion 2 times a day. - Reposition patient every 2 hours.   - Dangle patient 2 times a day  - Stand patient 2 times a day  - Ambulate patient 2 times a day  - Out of bed to chair 2 times a day   - Out of bed for meals 2 times a day  - Out of bed for toileting  - Record patient progress and toleration of activity level   Outcome: Progressing     Problem: DISCHARGE PLANNING  Goal: Discharge to home or other facility with appropriate resources  Description: INTERVENTIONS:  - Identify barriers to discharge w/patient and caregiver  - Arrange for needed discharge resources and transportation as appropriate  - Identify discharge learning needs (meds, wound care, etc.)  - Arrange for interpretive services to assist at discharge as needed  - Refer to Case Management Department for coordinating discharge planning if the patient needs post-hospital services based on physician/advanced practitioner order or complex needs related to functional status, cognitive ability, or social support system  Outcome: Progressing     Problem: Knowledge Deficit  Goal: Patient/family/caregiver demonstrates understanding of disease process, treatment plan, medications, and discharge instructions  Description: Complete learning assessment and assess knowledge base.   Interventions:  - Provide teaching at level of understanding  - Provide teaching via preferred learning methods  Outcome: Progressing     Problem: CARDIOVASCULAR - ADULT  Goal: Maintains optimal cardiac output and hemodynamic stability  Description: INTERVENTIONS:  - Monitor I/O, vital signs and rhythm  - Monitor for S/S and trends of decreased cardiac output  - Administer and titrate ordered vasoactive medications to optimize hemodynamic stability  - Assess quality of pulses, skin color and temperature  - Assess for signs of decreased coronary artery perfusion  - Instruct patient to report change in severity of symptoms  Outcome: Progressing  Goal: Absence of cardiac dysrhythmias or at baseline rhythm  Description: INTERVENTIONS:  - Continuous cardiac monitoring, vital signs, obtain 12 lead EKG if ordered  - Administer antiarrhythmic and heart rate control medications as ordered  - Monitor electrolytes and administer replacement therapy as ordered  Outcome: Progressing     Problem: GASTROINTESTINAL - ADULT  Goal: Maintains or returns to baseline bowel function  Description: INTERVENTIONS:  - Assess bowel function  - Encourage oral fluids to ensure adequate hydration  - Administer IV fluids if ordered to ensure adequate hydration  - Administer ordered medications as needed  - Encourage mobilization and activity  - Consider nutritional services referral to assist patient with adequate nutrition and appropriate food choices  Outcome: Progressing     Problem: METABOLIC, FLUID AND ELECTROLYTES - ADULT  Goal: Fluid balance maintained  Description: INTERVENTIONS:  - Monitor labs   - Monitor I/O and WT  - Instruct patient on fluid and nutrition as appropriate  - Assess for signs & symptoms of volume excess or deficit  Outcome: Progressing     Problem: MOBILITY - ADULT  Goal: Maintain or return to baseline ADL function  Description: INTERVENTIONS:  -  Assess patient's ability to carry out ADLs; assess patient's baseline for ADL function and identify physical deficits which impact ability to perform ADLs (bathing, care of mouth/teeth, toileting, grooming, dressing, etc.)  - Assess/evaluate cause of self-care deficits   - Assess range of motion  - Assess patient's mobility; develop plan if impaired  - Assess patient's need for assistive devices and provide as appropriate  - Encourage maximum independence but intervene and supervise when necessary  - Involve family in performance of ADLs  - Assess for home care needs following discharge   - Consider OT consult to assist with ADL evaluation and planning for discharge  - Provide patient education as appropriate  Outcome: Progressing  Goal: Maintains/Returns to pre admission functional level  Description: INTERVENTIONS:  - Perform BMAT or MOVE assessment daily.   - Set and communicate daily mobility goal to care team and patient/family/caregiver. - Collaborate with rehabilitation services on mobility goals if consulted  - Perform Range of Motion 2 times a day. - Reposition patient every 2 hours.   - Dangle patient 2 times a day  - Stand patient 2 times a day  - Ambulate patient 2 times a day  - Out of bed to chair 2 times a day   - Out of bed for meals 2 times a day  - Out of bed for toileting  - Record patient progress and toleration of activity level   Outcome: Progressing

## 2023-08-05 VITALS
SYSTOLIC BLOOD PRESSURE: 122 MMHG | HEART RATE: 95 BPM | DIASTOLIC BLOOD PRESSURE: 89 MMHG | WEIGHT: 148.37 LBS | HEIGHT: 70 IN | BODY MASS INDEX: 21.24 KG/M2 | TEMPERATURE: 97.9 F | RESPIRATION RATE: 18 BRPM | OXYGEN SATURATION: 98 %

## 2023-08-05 LAB
ANION GAP SERPL CALCULATED.3IONS-SCNC: 6 MMOL/L
BUN SERPL-MCNC: 18 MG/DL (ref 5–25)
CALCIUM SERPL-MCNC: 8.5 MG/DL (ref 8.4–10.2)
CHLORIDE SERPL-SCNC: 103 MMOL/L (ref 96–108)
CO2 SERPL-SCNC: 28 MMOL/L (ref 21–32)
CREAT SERPL-MCNC: 0.98 MG/DL (ref 0.6–1.3)
GFR SERPL CREATININE-BSD FRML MDRD: 78 ML/MIN/1.73SQ M
GLUCOSE SERPL-MCNC: 88 MG/DL (ref 65–140)
POTASSIUM SERPL-SCNC: 3.8 MMOL/L (ref 3.5–5.3)
SODIUM SERPL-SCNC: 137 MMOL/L (ref 135–147)

## 2023-08-05 PROCEDURE — 80048 BASIC METABOLIC PNL TOTAL CA: CPT | Performed by: INTERNAL MEDICINE

## 2023-08-05 PROCEDURE — 99239 HOSP IP/OBS DSCHRG MGMT >30: CPT | Performed by: INTERNAL MEDICINE

## 2023-08-05 RX ADMIN — ASPIRIN 81 MG: 81 TABLET, COATED ORAL at 08:20

## 2023-08-05 RX ADMIN — FUROSEMIDE 40 MG: 40 TABLET ORAL at 08:20

## 2023-08-05 RX ADMIN — METOPROLOL SUCCINATE 25 MG: 25 TABLET, EXTENDED RELEASE ORAL at 08:20

## 2023-08-05 RX ADMIN — POLYETHYLENE GLYCOL 3350 17 G: 17 POWDER, FOR SOLUTION ORAL at 08:20

## 2023-08-05 RX ADMIN — APIXABAN 5 MG: 5 TABLET, FILM COATED ORAL at 08:20

## 2023-08-05 RX ADMIN — EMPAGLIFLOZIN 10 MG: 10 TABLET, FILM COATED ORAL at 08:21

## 2023-08-05 NOTE — DISCHARGE INSTR - AVS FIRST PAGE
Please weigh yourself daily. If you note that your weight continues to decrease please decrease lasix dose to 20mg daily.

## 2023-08-05 NOTE — DISCHARGE SUMMARY
[facility]    Patient Information: Starda Masters 75 y.o. male MRN: [id number]  Unit/Bed#: E4 -01 Encounter: [id number]    Discharging Physician / Practitioner: Min Ho DO  PCP: No primary care provider on file. Admission Date: 8/1/2023  Discharge Date: 08/05/23    Disposition:     Home     Reason for Admission: acute/chronic systolic and diastolic chf    Discharge Diagnoses:     Principal Problem:    Acute on chronic combined systolic and diastolic congestive heart failure (HCC)  Active Problems:    Cardiomyopathy, ischemic    CKD (chronic kidney disease), stage III (HCC)    Coronary artery disease involving native coronary artery of native heart without angina pectoris    Nonrheumatic aortic valve insufficiency    Atrial fibrillation (HCC)    Uninsured    Prediabetes    Pleural effusion, bilateral    Chest pain  Resolved Problems:    * No resolved hospital problems. *      Consultations During Hospital Stay:  · Cardiology     Procedures Performed:     · none    Significant Findings / Test Results:   XR chest pa & lateral  Result Date: 8/4/2023  Impression: Diminished small bilateral pleural effusions No acute cardiopulmonary disease. Echo complete w/ contrast if indicated  Result Date: 8/2/2023  Narrative: •  Left Ventricle: Left ventricular cavity size is mildly dilated. Wall thickness is mildly increased. The left ventricular ejection fraction is 25%. Systolic function is severely reduced. Diastolic function is abnormal.  Left atrial filling pressure is elevated. •  The following segments are akinetic: basal inferior, basal anterolateral, mid anterolateral, apical septal, apical lateral and apex. •  The following segments are hypokinetic: basal anteroseptal, basal inferoseptal, basal inferolateral, mid anterior, mid anteroseptal, mid inferoseptal, mid inferior, mid inferolateral, apical anterior and apical inferior.  •  Right Ventricle: Systolic function is mildly reduced. •  Left Atrium: The atrium is severely dilated (>48 mL/m2). •  Aortic Valve: The leaflets are severely calcified. There is severely reduced mobility. There is mild regurgitation. There is moderate to severe stenosis. The aortic valve peak velocity is 2.98 m/s. The aortic valve mean gradient is 21 mmHg. The aortic valve area is 1.05 cm2. The aortic valve velocity is increased due to stenosis but lower than expected due to the presence of decreased flow (decreased EF). •  Mitral Valve: There is severe regurgitation. •  Tricuspid Valve: There is moderate regurgitation. The right ventricular systolic pressure is moderately to severely elevated. The estimated right ventricular systolic pressure is 79.85 mmHg. •  Pulmonic Valve: There is moderate regurgitation. CTA dissection protocol chest/abdomen/pelvis  Result Date: 8/1/2023  Impression: No aortic aneurysm or dissection. Moderate right and small left pleural effusions with interlobular septal thickening and scattered subtle groundglass opacities. Findings are suggestive of developing pulmonary edema. Cardiomegaly       Incidental Findings:   · none    Test Results Pending at Discharge (will require follow up):   · none     Outpatient Tests Requested:  Repeat bmp in 1 to 2 weeks     Hospital Course:     Catrina Shelley is a 75 y.o. male patient who originally presented to the hospital on 8/1/2023 due to acute/chronic systolic and diastolic chf. He had been at [facility] and  with ischemic cmo. He had a ef of 20% with repeat echo showing ef 25%. It was recommended that he have an icd but insurance has been a barrier for him. He at first didn't have insurance but now has [place] medical assistance. He will follow up with cardiology outpt for icd placement. He had dyspnea on exertion and increased bnp. He was given 80mg iv lasix x1 and continue on 40mg iv lasix bid. He diuresed well. He was changed to po lasix of 40mg daily as jardiance was added.  Case management did look into affordability of jardiance and pt is able to afford this. His weight at d/c was 148. He wants to switch to [facility] cardiology in which a referral was placed. His arb was d/zenaida as pt had cough with this. On admission imagining revealed bilateral pleural effusions. The effusions had resolved s/p diuresis on repeat imagining. He was discharged to home. Discharge Day Visit / Exam:     * Please refer to separate progress note for these details *    Discharge instructions/Information to patient and family:   See after visit summary for information provided to patient and family. Provisions for Follow-Up Care:  See after visit summary for information related to follow-up care and any pertinent home health orders. Discharge Statement:  I spent 40 minutes discharging the patient. This time was spent on the day of discharge. I had direct contact with the patient on the day of discharge. Greater than 50% of the total time was spent examining patient, answering all patient questions, arranging and discussing plan of care with patient as well as directly providing post-discharge instructions. Additional time then spent on discharge activities. Discharge Medications:  See after visit summary for reconciled discharge medications provided to patient and family.

## 2023-08-05 NOTE — ASSESSMENT & PLAN NOTE
• Hx of CAD, CABG x2, last EF 25% on repeat echo   • Currently working with insurance for icd placement. This has been a barrier in the past. He will follow up outpt. He wants to follow up with [facility] cardiology   • Holding arb due during diuresis and history of cough.  Continue metoprolol, asa, and statin

## 2023-08-05 NOTE — ASSESSMENT & PLAN NOTE
· Patient has a prior history of atrial flutter, status post ablation  · Current history of paroxysmal atrial fibrillation  · Patient was previously on Coumadin however was discharged from the [facility] Coumadin clinic due to nonadherence- he again admits that he is not happy with utilizing coumadin   · Had been prescribed Eliquis as an outpatient however unable to afford out-of-pocket cost: [facility] cardiology working with [facility] to obtain insurance for patient- currently coordinating with case management here. · Currently rate controlled on home dose of metoprolol XL 50 mg twice daily- however due to lower bp will decrease to 25mg bid.    · Had been restarted on Eliquis on admission: case mangement did evaluate his eliquis and it is affordable for him  · Cardiology discussed the initiation of digoxin if rate control or bp issues

## 2023-08-05 NOTE — PLAN OF CARE
Problem: PAIN - ADULT  Goal: Verbalizes/displays adequate comfort level or baseline comfort level  Description: Interventions:  - Encourage patient to monitor pain and request assistance  - Assess pain using appropriate pain scale  - Administer analgesics based on type and severity of pain and evaluate response  - Implement non-pharmacological measures as appropriate and evaluate response  - Consider cultural and social influences on pain and pain management  - Notify physician/advanced practitioner if interventions unsuccessful or patient reports new pain  Outcome: Progressing     Problem: INFECTION - ADULT  Goal: Absence or prevention of progression during hospitalization  Description: INTERVENTIONS:  - Assess and monitor for signs and symptoms of infection  - Monitor lab/diagnostic results  - Monitor all insertion sites, i.e. indwelling lines, tubes, and drains  - [place] appropriate cooling/warming therapies per order  - Administer medications as ordered  - Instruct and encourage patient and family to use good hand hygiene technique    Outcome: Progressing     Problem: SAFETY ADULT  Goal: Patient will remain free of falls  Description: INTERVENTIONS:  - Educate patient/family on patient safety including physical limitations  - Instruct patient to call for assistance with activity   - Consult OT/PT to assist with strengthening/mobility   - Keep Call bell within reach  - Keep bed low and locked with side rails adjusted as appropriate  - Keep care items and personal belongings within reach  - Initiate and maintain comfort rounds    Outcome: Progressing  Goal: Maintain or return to baseline ADL function  Description: INTERVENTIONS:  -  Assess patient's ability to carry out ADLs; assess patient's baseline for ADL function and identify physical deficits which impact ability to perform ADLs (bathing, care of mouth/teeth, toileting, grooming, dressing, etc.)  - Assess/evaluate cause of self-care deficits   - Assess range of motion  - Assess patient's mobility; develop plan if impaired  - Assess patient's need for assistive devices and provide as appropriate  - Encourage maximum independence but intervene and supervise when necessary  - Involve family in performance of ADLs  - Assess for home care needs following discharge   - Consider OT consult to assist with ADL evaluation and planning for discharge  - Provide patient education as appropriate  Outcome: Progressing  Goal: Maintains/Returns to pre admission functional level  Description: INTERVENTIONS:  - Perform BMAT or MOVE assessment daily.   - Set and communicate daily mobility goal to care team and patient/family/caregiver. - Collaborate with rehabilitation services on mobility goals if consulted  - Ambulate patient 3 times a day  - Out of bed to chair 3 times a day   - Out of bed for meals 3 times a day  - Out of bed for toileting  - Record patient progress and toleration of activity level   Outcome: Progressing     Problem: DISCHARGE PLANNING  Goal: Discharge to home or other facility with appropriate resources  Description: INTERVENTIONS:  - Identify barriers to discharge w/patient and caregiver  - Arrange for needed discharge resources and transportation as appropriate  - Identify discharge learning needs (meds, wound care, etc.)  - Arrange for interpretive services to assist at discharge as needed  - Refer to Case Management Department for coordinating discharge planning if the patient needs post-hospital services based on physician/advanced practitioner order or complex needs related to functional status, cognitive ability, or social support system  Outcome: Progressing     Problem: Knowledge Deficit  Goal: Patient/family/caregiver demonstrates understanding of disease process, treatment plan, medications, and discharge instructions  Description: Complete learning assessment and assess knowledge base.   Interventions:  - Provide teaching at level of understanding  - Provide teaching via preferred learning methods  Outcome: Progressing     Problem: CARDIOVASCULAR - ADULT  Goal: Maintains optimal cardiac output and hemodynamic stability  Description: INTERVENTIONS:  - Monitor I/O, vital signs and rhythm  - Monitor for S/S and trends of decreased cardiac output  - Administer and titrate ordered vasoactive medications to optimize hemodynamic stability  - Assess quality of pulses, skin color and temperature  - Assess for signs of decreased coronary artery perfusion  - Instruct patient to report change in severity of symptoms  Outcome: Progressing  Goal: Absence of cardiac dysrhythmias or at baseline rhythm  Description: INTERVENTIONS:  - Continuous cardiac monitoring, vital signs, obtain 12 lead EKG if ordered  - Administer antiarrhythmic and heart rate control medications as ordered  - Monitor electrolytes and administer replacement therapy as ordered  Outcome: Progressing     Problem: MOBILITY - ADULT  Goal: Maintain or return to baseline ADL function  Description: INTERVENTIONS:  -  Assess patient's ability to carry out ADLs; assess patient's baseline for ADL function and identify physical deficits which impact ability to perform ADLs (bathing, care of mouth/teeth, toileting, grooming, dressing, etc.)  - Assess/evaluate cause of self-care deficits   - Assess range of motion  - Assess patient's mobility; develop plan if impaired  - Assess patient's need for assistive devices and provide as appropriate  - Encourage maximum independence but intervene and supervise when necessary  - Involve family in performance of ADLs  - Assess for home care needs following discharge   - Consider OT consult to assist with ADL evaluation and planning for discharge  - Provide patient education as appropriate  Outcome: Progressing  Goal: Maintains/Returns to pre admission functional level  Description: INTERVENTIONS:  - Perform BMAT or MOVE assessment daily.   - Set and communicate daily mobility goal to care team and patient/family/caregiver.    - Collaborate with rehabilitation services on mobility goals if consulted  - Ambulate patient 3 times a day  - Out of bed to chair 3 times a day   - Out of bed for meals 3 times a day  - Out of bed for toileting  - Record patient progress and toleration of activity level   Outcome: Progressing     Problem: GASTROINTESTINAL - ADULT  Goal: Maintains or returns to baseline bowel function  Description: INTERVENTIONS:  - Assess bowel function  - Encourage oral fluids to ensure adequate hydration  - Administer IV fluids if ordered to ensure adequate hydration  - Administer ordered medications as needed  - Encourage mobilization and activity  - Consider nutritional services referral to assist patient with adequate nutrition and appropriate food choices  Outcome: Progressing     Problem: METABOLIC, FLUID AND ELECTROLYTES - ADULT  Goal: Fluid balance maintained  Description: INTERVENTIONS:  - Monitor labs   - Monitor I/O and WT  - Instruct patient on fluid and nutrition as appropriate  - Assess for signs & symptoms of volume excess or deficit  Outcome: Progressing

## 2023-08-05 NOTE — ASSESSMENT & PLAN NOTE
Patient is a 26-year-old male with past medical history significant for coronary artery disease status post CABG, and ischemic cardiomyopathy with an ejection fraction in the 20s, as well as paroxysmal atrial fibrillation, not on anticoagulation, who presented with worsening dyspnea on exertion, orthopnea    · Patient has a history of chronic combined systolic and diastolic congestive heart failure and follows with [facility] cardiology: Diagnosed with ischemic cardiomyopathy  · Currently on Lasix 40 mg daily outpt, and notes he is compliant with this medication  · Patient presented with volume overload, dyspnea on exertion and orthopnea, interstitial edema on CAT scan, and BNP of 1244  · Diuresed with 80 mg IV Lasix in the ER and started on 50 mg IV twice daily- however missed a dose due to sbp 97. · Weight down from 166 pounds to 148 pounds- however there is question of accuracy of original weight from ed. · Diuresed 4 liters   · D/c losartan as pt had stopped this outpt due to cough. · Appreciate cardiology recommendations. · cxr shows resolution of pleural effusions   · bnp down to 500  · Further IV lasix were held- restarted on 40mg of lasix. Bmp stable. · Continue jardiance. It is affordable for pt. May require less diuretic in the future with the initiation of jardiance. · Outpatient cardiology office records note he was not able to receive ICD due to lack of insurance coverage: Their office is coordinating with [employer] to attempt to arrange insurance- however pt wants to follow up with [facility] cardiology. Will make the referral.   · Repeat 2D echo: Ef 25%. Moderate to severe aortic stenosis. · Case management looking into affordability of medications and insurance issues.

## 2023-08-05 NOTE — ASSESSMENT & PLAN NOTE
· Pt notes a several week h/o chest pain, starting in abdomen and radiating up the chest.  Denies exacerbating or alleviating factors. Has been constantly present for weeks. · EKG with inferior lateral T wave inversions, however these are not new, when compared with previous EKG history at [facility], seen 7/2. · Patient's troponins unremarkable. No evidence of acute ischemia.     · Continue medical management with aspirin, beta-blocker, statin

## 2023-08-05 NOTE — PROGRESS NOTES
[address] [facility]  Progress Note  Name: Sada Zavala  MRN: [id number]  Unit/Bed#: E4 -01 I Date of Admission: 8/1/2023   Date of Service: 8/5/2023 I Hospital Day: 3    Assessment/Plan   * Acute on chronic combined systolic and diastolic congestive heart failure [facility])  Assessment & Plan  Patient is a 41-year-old male with past medical history significant for coronary artery disease status post CABG, and ischemic cardiomyopathy with an ejection fraction in the 25s, as well as paroxysmal atrial fibrillation, not on anticoagulation, who presented with worsening dyspnea on exertion, orthopnea    · Patient has a history of chronic combined systolic and diastolic congestive heart failure and follows with [facility] ([place] [place] cardiology: Diagnosed with ischemic cardiomyopathy  · Currently on Lasix 40 mg daily outpt, and notes he is compliant with this medication  · Patient presented with volume overload, dyspnea on exertion and orthopnea, interstitial edema on CAT scan, and BNP of 1244  · Diuresed with 80 mg IV Lasix in the ER and started on 50 mg IV twice daily- however missed a dose due to sbp 97. · Weight down from 166 pounds to 148 pounds- however there is question of accuracy of original weight from ed. · Diuresed 4 liters   · D/c losartan as pt had stopped this outpt due to cough. · Appreciate cardiology recommendations. · cxr shows resolution of pleural effusions   · bnp down to 500  · Further IV lasix were held- restarted on 40mg of lasix. Bmp stable. · Continue jardiance. It is affordable for pt. May require less diuretic in the future with the initiation of jardiance. · Outpatient cardiology office records note he was not able to receive ICD due to lack of insurance coverage: Their office is coordinating with [facility] to attempt to arrange insurance- however pt wants to follow up with [facility] cardiology. Will make the referral.   · Repeat 2D echo: Ef 25%. Moderate to severe aortic stenosis. · Case management looking into affordability of medications and insurance issues. Chest pain  Assessment & Plan  · Pt notes a several week h/o chest pain, starting in abdomen and radiating up the chest.  Denies exacerbating or alleviating factors. Has been constantly present for weeks. · EKG with inferior lateral T wave inversions, however these are not new, when compared with previous EKG history at [facility], seen 7/2. · Patient's troponins unremarkable. No evidence of acute ischemia. · Continue medical management with aspirin, beta-blocker, statin    Pleural effusion, bilateral  Assessment & Plan  • Patient with moderate right and small left pleural effusions, in the setting of interstitial edema, most likely secondary to CHF  • Monitor with diuresis  • No current respiratory distress or indication for urgent thoracentesis  • Repeat cxr shows resolution       Prediabetes  Assessment & Plan  Last A1c 6.3  Started on jardiance but more for the chf benefit     Uninsured  Assessment & Plan  Case management consultation:[place] has [place] medical assistance. Atrial fibrillation [facility])  Assessment & Plan  · Patient has a prior history of atrial flutter, status post ablation  · Current history of paroxysmal atrial fibrillation  · Patient was previously on Coumadin however was discharged from the [facility] Coumadin clinic due to nonadherence- he again admits that he is not happy with utilizing coumadin   · Had been prescribed Eliquis as an outpatient however unable to afford out-of-pocket cost: [facility] cardiology working with [employer] to obtain insurance for patient- currently coordinating with case management here. · Currently rate controlled on home dose of metoprolol XL 50 mg twice daily- however due to lower bp will decrease to 25mg bid.    · Had been restarted on Eliquis on admission: case mangement did evaluate his eliquis and it is affordable for him  · Cardiology discussed the initiation of digoxin if rate control or bp issues       Nonrheumatic aortic valve insufficiency  Assessment & Plan  · Patient with a history of moderate aortic stenosis on prior echocardiograms  · Repeat echo shows moderate to severe aortic stenosis    Coronary artery disease involving native coronary artery of native heart without angina pectoris  Assessment & Plan  • Patient has a history of coronary artery disease status post CABG in   • Catheterization in 2020 with patent LIMA-LAD, and mild disease of SVG-OM. Native left circumflex with diffuse disease, RCA with chronic total occlusion however collateral flow  • Continue home aspirin, beta-blocker, statin, ARB      CKD (chronic kidney disease), stage III [facility])  Assessment & Plan  Lab Results   Component Value Date    EGFR 78 2023    EGFR 62 2023    EGFR 58 2023    CREATININE 0.98 2023    CREATININE 1.19 2023    CREATININE 1.26 2023     · Baseline Cr: 1.0-1.4  • Creatinine currently at his baseline: Monitor with diuresis  • Monitor closely after patient received IV contrast with CAT scan in the ER    Cardiomyopathy, ischemic  Assessment & Plan  • Hx of CAD, CABG x2, last EF 25% on repeat echo   • Currently working with insurance for icd placement. This has been a barrier in the past. He will follow up outpt. He wants to follow up with [facility] cardiology   • Holding arb due during diuresis and history of cough. Continue metoprolol, asa, and statin                Discharge Plan / Estimated Discharge Date: d/c to home. Code Status: Level 1 - Full Code      Subjective:   Pt seen and examined. Pt doing well. No f/c no cp no sob no n/v/d no abd pain. He states he will weight himself daily at home and has a follow up on  or  with his doctor.      Objective:     Vitals:   Temp (24hrs), Av.4 °F (36.3 °C), Min:96.6 °F (35.9 °C), Max:97.9 °F (36.6 °C)    Temp:  [96.6 °F (35.9 °C)-97.9 °F (36.6 °C)] 97.9 °F (36.6 °C)  HR:  [74-96] 95  Resp:  [18-24] 18  BP: ()/(60-89) 122/89  SpO2:  [95 %-98 %] 98 %  Body mass index is 21.29 kg/m². Input and Output Summary (last 24 hours): Intake/Output Summary (Last 24 hours) at 8/5/2023 0900  Last data filed at 8/5/2023 0816  Gross per 24 hour   Intake 1068 ml   Output 1550 ml   Net -482 ml       Physical Exam:  Physical Exam  Constitutional:       Appearance: Normal appearance. HENT:      Head: Normocephalic and atraumatic. Eyes:      Extraocular Movements: Extraocular movements intact. Pupils: Pupils are equal, round, and reactive to light. Cardiovascular:      Rate and Rhythm: Normal rate and regular rhythm. Heart sounds: No murmur heard. No friction rub. No gallop. Pulmonary:      Effort: Pulmonary effort is normal. No respiratory distress. Breath sounds: Normal breath sounds. No wheezing, rhonchi or rales. Abdominal:      General: Bowel sounds are normal. There is no distension. Palpations: Abdomen is soft. Tenderness: There is no abdominal tenderness. There is no guarding or rebound. Musculoskeletal:      Right lower leg: No edema. Left lower leg: No edema. Neurological:      Mental Status: He is alert and oriented to person, place, and time.           Additional Data:     Labs:  Results from last 7 days   Lab Units 08/03/23  0450   WBC Thousand/uL 5.11   HEMOGLOBIN g/dL 13.8   HEMATOCRIT % 44.0   PLATELETS Thousands/uL 155   NEUTROS PCT % 50   LYMPHS PCT % 32   MONOS PCT % 12   EOS PCT % 5     Results from last 7 days   Lab Units 08/05/23  0454 08/02/23  0427 08/01/23  2038   SODIUM mmol/L 137   < > 135   POTASSIUM mmol/L 3.8   < > 4.2   CHLORIDE mmol/L 103   < > 103   CO2 mmol/L 28   < > 26   BUN mg/dL 18   < > 24   CREATININE mg/dL 0.98   < > 1.47*   ANION GAP mmol/L 6   < > 6   CALCIUM mg/dL 8.5   < > 8.7   ALBUMIN g/dL  --   --  3.7   TOTAL BILIRUBIN mg/dL  --   --  1.24*   ALK PHOS U/L  --   --  83   ALT U/L  --   --  31   AST U/L  --   --  37   GLUCOSE RANDOM mg/dL 88   < > 140    < > = values in this interval not displayed.      Results from last 7 days   Lab Units 08/01/23 2038   INR  1.19                     Recent Cultures (last 7 days):           Lines/Drains:  Invasive Devices     Peripheral Intravenous Line  Duration           Peripheral IV 08/01/23 Left Antecubital 4 days              Last 24 Hours Medication List:   Current Facility-Administered Medications   Medication Dose Route Frequency Provider Last Rate   • acetaminophen  650 mg Oral Q4H PRN Pineda Gibbs PA-C     • aluminum-magnesium hydroxide-simethicone  30 mL Oral Q6H PRN Pineda Gibbs PA-C     • apixaban  5 mg Oral BID Pineda Gibbs PA-C     • aspirin  81 mg Oral Daily Pineda Gibbs PA-C     • atorvastatin  40 mg Oral Daily With Asad Spencer PA-C     • Empagliflozin  10 mg Oral Daily Ana M Mo DO     • furosemide  40 mg Oral Daily Ana M Mo DO     • metoprolol succinate  25 mg Oral BID Ana M Mo DO     • ondansetron  4 mg Intravenous Q4H PRN Daniel Pierre MD     • oxyCODONE  2.5 mg Oral Q4H PRN Pineda Gibbs PA-C     • perflutren lipid microsphere  0.4 mL/min Intravenous Once in imaging Daniel Pierre MD     • polyethylene glycol  17 g Oral Daily Pineda Gibbs PA-C          Today, Patient Was Seen By: Dominique Holley DO

## 2023-08-05 NOTE — ASSESSMENT & PLAN NOTE
Lab Results   Component Value Date    EGFR 78 08/05/2023    EGFR 62 08/04/2023    EGFR 58 08/03/2023    CREATININE 0.98 08/05/2023    CREATININE 1.19 08/04/2023    CREATININE 1.26 08/03/2023     · Baseline Cr: 1.0-1.4  • Creatinine currently at his baseline: Monitor with diuresis  • Monitor closely after patient received IV contrast with CAT scan in the ER

## 2023-08-07 ENCOUNTER — TELEPHONE (OUTPATIENT)
Dept: CARDIOLOGY CLINIC | Facility: CLINIC | Age: 68
End: 2023-08-07

## 2023-08-07 ENCOUNTER — PATIENT OUTREACH (OUTPATIENT)
Dept: CASE MANAGEMENT | Facility: HOSPITAL | Age: 68
End: 2023-08-07

## 2023-08-07 DIAGNOSIS — I50.43 ACUTE ON CHRONIC COMBINED SYSTOLIC AND DIASTOLIC CONGESTIVE HEART FAILURE (HCC): Primary | ICD-10-CM

## 2023-08-07 NOTE — TELEPHONE ENCOUNTER
Patient was admitted for CHF, he was discharged on 8/5/23. Please contact patient to schedule their HFU by following the HFU protocol and attach the referral.     Thank you.

## 2023-08-07 NOTE — PROGRESS NOTES
Heart Failure Outpatient Care Coordinator Note: Patient identified on hospital discharge HRR report, chart notes reviewed and referral made for HF outpatient care management.

## 2023-08-07 NOTE — UTILIZATION REVIEW
URGENT/EMERGENT  INPATIENT/SPU AUTHORIZATION REQUEST    Date: 08/07/23            # Pages in this Request:     X New Request   Additional Information for PA#:     Office Contact Name:  Carolina Gould Title: Utilization Review, [place] Nurse     Phone: [phone]  Ext. Availability (Date/Time): Wednesday - Friday 8 am- 4 pm    X Inpatient Review  SPU Review       X Current        Late Pick-up   · How your facility was first notified of the Late Pick-up:   · When your facility was first notified of the Late Pick-up (date):          RECIPIENT INFORMATION    Recipient VX#:[id number]    Recipient Name: Gwen Delong    YOB: 1955  75 y.o. Recipient Alias:     Gender:  X Male  Female Medicaid Eligibility ([address] [place] [address]): INSURANCE INFORMATION    (All other private or governmental health insurance benefits must be utilized prior to billing the [place] Program)    Was this admission the result of an MVA, other accident, assault, injury, fall, gunshot, bite etc.? Yes X No                   If yes, provide a brief description of the incident. Does the recipient have other insurance coverage? Yes X No        Insurance Company Name/Policy #      Did that insurance pay on this claim? Yes  No        Did that insurance deny this claim? Yes  No    If yes, reason for denial:      Does the recipient have Medicare? Yes X No        Did Medicare exhaust prior to this admission? Yes  No            Did Medicare partially pay this claim? Yes  No        Did that insurance deny this claim? Yes  No    If yes, reason for denial:          Was the recipient a prisoner at the time of admission?    Yes X No            PROVIDER INFORMATION    Hospital Name: [facility]   [address] Provider ID#: [id number]    [address]sey Physician Name: [facility]  [employer] Provider ID#: [id number]        ADMISSION INFORMATION    Type of Admission: (please choose one)    X ED      Direct    If yes, from where? Transfer    If yes, transferring hospital (inpatient, rehab, or psych) Provider Name/Provider ID#: Admission Floor or Unit Type: MED-SURG    Dates/Times:        ED Date/Time: 8/1/2023  19:55 PM        Observation Date/Time: 8/1/2023   2331        Admission Date/Time: 8/2/23  07:33 AM        Discharge or Transfer Date/Time: 8/5/2023 0915 AM        DIAGNOSIS/PROCEDURE CODES    Primary Diagnosis Code/Primary Diagnosis Code description:   Atrial flutter ([address]) [I48.92]  Epigastric pain [R10.13]  Pleural effusion [J90]  CHF exacerbation ([address]) [I50.9]  (MAY RE-ORDER DX CODES)  Additional Diagnosis Code(s) and Description(s)-(up to three additional codes):     Procedure Code (one) and description: NONE        CLINICAL INFORMATION - PRIOR ADMISSION ONLY    Is there a prior admission with a discharge date within 30 days of the date of this admission? X No (Proceed to the next section - "Clinical Information - General Review Checklist:)      Yes (Provide the following information)     Prior admission dates:    MA Prior Authorization Number:        Review Outcome:     Diagnosis Code(s)/Description:    Procedure Code/Description:    Findings:    Treatment:    Condition on Discharge:   Vitals:    Labs:   Imaging:   Medications: Follow-up Instructions:    Disposition:        CLINICAL INFORMATION - GENERAL REVIEW CHECKLIST    EMERGENCY DEPARTMENT: (Proceed to "ADMISSION" if Direct Admission)    Presenting Signs/Symptoms:    Epigastric Pain       Pt reports sudden onset of sharp epigastric pain that radiates into chest starting PTA, pt reports using BR and felt like couldn't get up after, 50mcg fentanyl given by EMS with some relief         Initial Presentation: 75 y.o. male presents to the ED via EMS from home with c/o sudden onset epigastric chest pain with radiation to LLQ, worsening SOB w/ exertion, cough. Legs gave out and he fell to ground, no head strike, was unable to get up by himself.   EMS treated with Fentanyl. Recently taken off Lisinopril and put on Losartan. PMH:  ischemic cardiomyopathy, HFrEF, A flutter on Eliquis, CKD3, CABG. In the ED pt was tachycardic. Labs - elevated BNP, creat, negative troponins. Imaging - pulm edema, cardiomegaly. ECG shows A flutter w/ RVR. Treated with IV Morphine, IV Lopressor, IV Lasix 80 mg. On exam diminished breath sounds bilat lower lobes. He is admitted to OBSERVATION status with acute on chronic comb CHF  - IV Lasix 50 mg BID, continue Metoprolol, Losartan, TTE, daily wt, low NA diet, lytes mgmt, oxygen. Bilat pleural effusions - repeat CXR post diuresis. A flutter - Eliquis. Date: 8/2   Day 2: CHANGED TO INPATIENT  Creat down to 1.40. Pt remains on IV Lasix BID for CHF exacerbation. Pt remains on oxygen today. HR WNL. Medication/treatment prior to arrival in the ED:     Past Medical History:    Active Ambulatory Problems     Diagnosis Date Noted   • Long term (current) use of anticoagulants 07/18/2016   • Subclinical hypothyroidism 08/01/2016     Resolved Ambulatory Problems     Diagnosis Date Noted   • Aortic sclerosis 07/19/2016   • Chronic systolic CHF (congestive heart failure) ([address]) 06/07/2023   • Cough 10/19/2020   • HFrEF (heart failure with reduced ejection fraction) ([address]) 07/16/2021   • NSVT (nonsustained ventricular tachycardia) ([address]) 08/21/2020   • SOB (shortness of breath) 08/20/2020   • Thrombocytopenia ([address]) 07/19/2016     No Additional Past Medical History       Clinical Exam:     Initial Vital Signs: (Temp, Pulse, Resp, and BP)   ED Triage Vitals   Temperature Pulse Respirations Blood Pressure SpO2   08/01/23 2045 08/01/23 1958 08/01/23 1958 08/01/23 1959 08/01/23 1959   98 °F (36.7 °C) (!) 122 21 136/95 99 %      Temp Source Heart Rate Source Patient Position - Orthostatic VS BP Location FiO2 (%)   08/02/23 0017 08/01/23 1958 08/01/23 2000 08/01/23 2000 --   Temporal Monitor Lying Right arm       Pain Score 08/01/23 2031       10 - Worst Possible Pain           Pertinent Repeat Vital Signs: (include times they were obtained)  /23 0750 95.6 °F (35.3 °C) Abnormal  94 20 113/93 98 96 % -- -- None (Room air) Sitting   08/02/23 0312 97.1 °F (36.2 °C) Abnormal  86 20 115/79 91 100 % 28 2 L/min Nasal cannula Lying   08/02/23 0017 97.3 °F (36.3 °C) Abnormal  89 20 143/100 113 100 % 28 2 L/min Nasal cannula Sitting   08/01/23 2345 -- 108 Abnormal  20 134/93 107 98 % 28 2 L/min Nasal cannula Lying   08/01/23 2330 -- 87 20 109/81 91 97 % 28 2 L/min Nasal cannula Lying   08/01/23 2300 -- 87 20 119/90 101 98 % 28 2 L/min Nasal cannula Lying   08/01/23 2245 -- 90 20 112/83 94 97 % 28 2 L/min Nasal cannula Lying   08/01/23 2230 -- 91 20 116/85 97 96 % 28 2 L/min Nasal cannula Lying   08/01/23 2130 -- 85 20 111/78 90 96 % 28 2 L/min Nasal cannula Lying   08/01/23 2100 -- 92 20 115/84 97 96 % 28 2 L/min Nasal cannula Lying   08/01/23 2045 98 °F (36.7 °C) 98 20 129/86 102 81 % Abnormal  -- -- None (Room air) Lying   08/01/23 2030 -- 109 Abnormal  22 133/76 95 99 % -- -- None (Room air) Lying   08/01/23 2024 -- -- -- -- -- -- -- -- None (Room air) --   08/01/23 2000 -- 119 Abnormal  22 136/95 109 99 % -- -- None (Room air) Lying       Pertinent Sustained Findings: (include times they were obtained)    Weight in Kilograms:  Wt Readings from Last 1 Encounters:   08/05/23 67.3 kg (148 lb 5.9 oz)     08/02/23 72 kg (158 lb 11.7 oz)       Pertinent Labs (results):  Results from last 7 days   Lab Units 08/02/23  0427 08/01/23 2038   WBC Thousand/uL 5.54 6.50   HEMOGLOBIN g/dL 13.1 13.0   HEMATOCRIT % 41.9 42.3   PLATELETS Thousands/uL 149 166   NEUTROS ABS Thousands/µL 3.92 2.71                Results from last 7 days   Lab Units 08/02/23  0427 08/01/23 2038   SODIUM mmol/L 139 135   POTASSIUM mmol/L 4.4 4.2   CHLORIDE mmol/L 102 103   CO2 mmol/L 29 26   ANION GAP mmol/L 8 6   BUN mg/dL 25 24   CREATININE mg/dL 1.40* 1.47*   EGFR ml/min/1.73sq m 51 48   CALCIUM mg/dL 9.1 8.7   MAGNESIUM mg/dL 2.5  --            Results from last 7 days   Lab Units 08/01/23 2038   AST U/L 37   ALT U/L 31   ALK PHOS U/L 83   TOTAL PROTEIN g/dL 6.6   ALBUMIN g/dL 3.7   TOTAL BILIRUBIN mg/dL 1.24*                Results from last 7 days   Lab Units 08/02/23  0427 08/01/23 2038   GLUCOSE RANDOM mg/dL 122 140            Results from last 7 days   Lab Units 08/01/23  2230 08/01/23 2038   HS TNI 0HR ng/L  --  25   HS TNI 2HR ng/L 25  --    HSTNI D2 ng/L 0  --                Results from last 7 days   Lab Units 08/01/23 2038   PROTIME seconds 15.1*   INR   1.19   PTT seconds 51*           Results from last 7 days   Lab Units 08/01/23 2038   TSH 3RD GENERATON uIU/mL 3.915           Results from last 7 days   Lab Units 08/01/23 2038   BNP pg/mL 1,244*           Results from last 7 days   Lab Units 08/01/23 2038   LIPASE u/L 22        Radiology (results):  CTA dissection protocol chest/abdomen/pelvis   Final Result by Trevor Nieto DO (08/01 2251)       No aortic aneurysm or dissection.       Moderate right and small left pleural effusions with interlobular septal thickening and scattered subtle groundglass opacities. Findings are suggestive of developing pulmonary edema.       Cardiomegaly       EKG (results):   8/1 ECG - Atrial flutter with variable A-V block  Rightward axis  ST & T wave abnormality, consider inferior ischemia  Abnormal ECG  8/1 ECG - Atrial flutter with variable A-V block  Rightward axis  ST & T wave abnormality, consider inferior ischemia  Prolonged QT  Abnormal ECG    Other tests (results):  8/2 ECHO:  •  Left Ventricle: Left ventricular cavity size is mildly dilated. Wall thickness is mildly increased. The left ventricular ejection fraction is 25%. Systolic function is severely reduced. Diastolic function is abnormal.  Left atrial filling pressure is elevated.   •  The following segments are akinetic: basal inferior, basal anterolateral, mid anterolateral, apical septal, apical lateral and apex. •  The following segments are hypokinetic: basal anteroseptal, basal inferoseptal, basal inferolateral, mid anterior, mid anteroseptal, mid inferoseptal, mid inferior, mid inferolateral, apical anterior and apical inferior. •  Right Ventricle: Systolic function is mildly reduced. •  Left Atrium: The atrium is severely dilated (>48 mL/m2). •  Aortic Valve: The leaflets are severely calcified. There is severely reduced mobility. There is mild regurgitation. There is moderate to severe stenosis. The aortic valve peak velocity is 2.98 m/s. The aortic valve mean gradient is 21 mmHg. The aortic valve area is 1.05 cm2. The aortic valve velocity is increased due to stenosis but lower than expected due to the presence of decreased flow (decreased EF). •  Mitral Valve: There is severe regurgitation. •  Tricuspid Valve: There is moderate regurgitation. The right ventricular systolic pressure is moderately to severely elevated. The estimated right ventricular systolic pressure is 51.33 mmHg. •  Pulmonic Valve: There is moderate regurgitation.       Tests pending final results:    Treatment in the ED:   Medication Administration from 08/01/2023 1955 to 08/02/2023 0007       Date/Time Order Dose Route Action Action by Comments     08/01/2023 2001 EDT fentanyl citrate (PF) (FOR EMS ONLY) 100 mcg/2 mL injection 100 mcg 0 mcg Does not apply Given to EMS       08/01/2023 2031 EDT morphine injection 4 mg 4 mg Intravenous Given       08/01/2023 2032 EDT metoprolol (LOPRESSOR) injection 5 mg 5 mg Intravenous Given                  08/01/2023 2230 EDT furosemide (LASIX) injection 80 mg 80 mg Intravenous Given             Meds: Name, dose, route, time, number of doses given        Nebulizer treatments: Type, total number given      IVs: Type, rate, total amt. given          Other treatments:       Change in condition while in the ED:       Response to ED Treatment: OBSERVATION: (Proceed to "ADMISSION" if Direct Admission)    Orders written during the observation period  Tele  Daily wt  Fluid restriction  Echo  IP CONSULT TO NUTRITION SERVICES  IP CONSULT TO CASE MANAGEMENT    Meds Name, dose, route, time, how may doses given:  apixaban, 5 mg, Oral, BID  aspirin, 81 mg, Oral, Daily  atorvastatin, 40 mg, Oral, Daily With Dinner  furosemide, 50 mg, Intravenous, BID  losartan, 50 mg, Oral, Daily  metoprolol succinate, 50 mg, Oral, BID  polyethylene glycol, 17 g, Oral, Daily        Continuous IV Infusions:  PRN Meds:  acetaminophen, 650 mg, Oral, Q4H PRN  aluminum-magnesium hydroxide-simethicone, 30 mL, Oral, Q6H PRN  oxyCODONE, 2.5 mg, Oral, Q4H PRN      Labs, imaging, other:  Consults and findings:  Acute on chronic combined systolic and diastolic congestive heart failure (HCC)  Assessment & Plan      Wt Readings from Last 3 Encounters:   08/01/23 75.3 kg (166 lb 0.1 oz)      Presenting with worsening dyspnea on exertion, orthopnea, trace LE edema. [facility] cardiology hx of ischemic cardiomyopathy.  Reports medication compliance     BNP 1244  CTA reviewed with pleural effusions, septal thickening, subtle GGO, cardiomegaly, without aneurysm/dissection   TTE 08/2020: LVEF 20%, inferior/basal akinesis, severe RV dilation, MV thickening, severe AV thickening   Cardiac cath 08/2020: severe stable multivessel CAD, mild diffuse left main disease, chronic 100% proximal LAD occlusion, mid/distal LCx 70% occlusion, chronic 100% mid R occlusion   Dry Weight: ~160lbs      Plan:  • 80 Lasix in ED with good response, Uptitrate diuretic from lasix 40mg PO daily to lasix 50mg IV BID   • Continue PTA metoprolol XL 50mg twice daily, Losartan 50mg  • Update TTE  • Monitor daily weight, I/Os, cardiac diet (sodium restriction), monitor K>4, Mg>2      Pleural effusion, bilateral  Assessment & Plan  Likely transudative effusion secondary to CHF     Plan:  • CTA reviewed with bilateral pleural effusions with interlobular septal thickening   • Attempt aggressive diuresis, repeat CXR after diuresis for evaluation of resolution         Prediabetes  Assessment & Plan  Last A1c 6.3     Plan:  • Monitor AC/HS accucheck        Uninsured  Assessment & Plan  Case management consultation     Typical atrial flutter (HCC)  Assessment & Plan  In atrial flutter on admission, previously had ROSIBEL DCCV     Plan:  • HR fairly controlled on admission   • Continue metoprolol XL 50mg BID   • Anticoagulation: Eliquis 5mg BID, reports compliance, previously worked with [facility] case management for coverage        Nonrheumatic aortic valve insufficiency  Assessment & Plan  Murmur noted, last echo with severe AV thickening      Coronary artery disease involving native coronary artery of native heart without angina pectoris  Assessment & Plan  Noted CABG x2     Plan:  • Continue ASA/atorvastatin/antihypertensives  • Cardiac diet        CKD (chronic kidney disease), stage III (HCC)  Assessment & Plan        Lab Results   Component Value Date     EGFR 48 08/01/2023     CREATININE 1.47 (H) 08/01/2023      In the setting of HTN, cardiorenal disease   Baseline Cr: 1.0-1.4     Plan:  • Admit Cr: 1.47 > trend  • Monitor renal function, renal dose medications, maintain normotension/euvolemia, avoid nephrotoxic agents, I&Os     Cardiomyopathy, ischemic  Assessment & Plan  Hx of CAD, CABG x2, last EF 20%     Plan:  • EKG with Atrial flutter, inferior ST changes  • Trop 25>25  • Last cardiology note reports he is not a [place] citizen/no insurance. [facility] was involved, he previously was unable to afford 2D echocardiograms/ICD implantation         Test Results during the observation period  Pertinent Lab tests (dates/results):  Culture results (blood, urine, spinal, wound, respiratory, etc.):  Imaging tests (dates/results):  EKG (dates/results):   Other test (dates/results):  Tests pending (dates/results):    Surgical or Invasive Procedures during the observation period  Name of surgery/procedure:  Date & Time:  Patient Response:  Post-operative orders:  Operative Report/Findings:    Response to Treatment, Major Change in Condition, Major Charge in Treatment during the observation period  OBSERVATION 8/1 CHANGED TO INPATIENT ON 8/2 @ 0733 - IV DIURESIS FOR ACUTE ON CHRONIC CHF        ADMISSION:    DIRECT Admissions Only:    · Presenting Signs/Symptoms:   ·   · Medication/treatment prior to arrival:  ·   · Past Medical History:  ·   · Clinical Exam on admission:  ·   · Vital Signs on admission: (Temp, Pulse, Resp, and BP)  ·   · Weight in kilograms:     ALL Admissions:    Admission Orders and Other Orders written within the first 24 hrs after admission  Meds Name, dose, route, time, how may doses given:  apixaban, 5 mg, Oral, BID  aspirin, 81 mg, Oral, Daily  atorvastatin, 40 mg, Oral, Daily With Dinner  furosemide, 50 mg, Intravenous, BID  losartan, 50 mg, Oral, Daily  metoprolol succinate, 50 mg, Oral, BID  polyethylene glycol, 17 g, Oral, Daily        Continuous IV Infusions:  PRN Meds:  acetaminophen, 650 mg, Oral, Q4H PRN  aluminum-magnesium hydroxide-simethicone, 30 mL, Oral, Q6H PRN  oxyCODONE, 2.5 mg, Oral, Q4H PRN    PRN Meds Name, dose, route, time, how many doses given within the first 24 hrs.:  IVs Type, rate, and total amt.  ordered/given:  Labs, imaging, other:      Consults and findings:  Acute on chronic combined systolic and diastolic congestive heart failure ([address])  Assessment & Plan  Patient is a 68-year-old male with past medical history significant for coronary artery disease status post CABG, and ischemic cardiomyopathy with an ejection fraction in the 20s, as well as paroxysmal atrial fibrillation, not on anticoagulation, who presented with worsening dyspnea on exertion, orthopnea     • Patient has a history of chronic combined systolic and diastolic congestive heart failure and follows with [facility] cardiology: Diagnosed with ischemic cardiomyopathy  • Most recent 2D echocardiogram 08/2020: LVEF 20%, inferior/basal akinesis, severe RV dilation, MV thickening, severe AV thickening   • Patient had repeat 2D echocardiogram ordered as an outpatient several months ago, however as he only have insurance coverage, he was unable to pay out-of-pocket for this study  • Patient is prescribed Lasix 40 mg daily, and notes he is compliant with this medication  • Patient presented with volume overload, dyspnea on exertion and orthopnea, interstitial edema on CAT scan, and proBNP of 124  • Patient was diuresed with 80 mg IV Lasix in the ER and started on 50 mg IV twice daily  • No significant decrease in weight thus far, compared with admission weight (ER weight not felt to be accurate)  • Continue home metoprolol XL 50 mg twice daily and home losartan 50 mg daily (was recently changed by his outpatient cardiologist from lisinopril)  • Outpatient cardiology office records note he was not able to receive ICD due to lack of insurance coverage: Their office is coordinating with [employer] to attempt to arrange insurance  • Repeat 2D echocardiogram performed today: Preliminary left ventricular ejection fraction 27%: Await official report        Atrial fibrillation [facility])  Assessment & Plan  • Patient has a prior history of atrial flutter, status post ablation  • Current history of paroxysmal atrial fibrillation  • Patient was previously on Coumadin however was discharged from the [facility] Coumadin clinic due to nonadherence  • Had been prescribed Eliquis as an outpatient however unable to afford out-of-pocket cost: [facility] cardiology working with [facility] health to obtain insurance for patient  • Currently rate controlled on home dose of metoprolol XL 50 mg twice daily  • Had been restarted on Eliquis on admission:  We will coordinate with case management regarding availability of program supplying Eliquis at discharge        Coronary artery disease involving native coronary artery of native heart without angina pectoris  Assessment & Plan  • Patient has a history of coronary artery disease status post CABG in 2008  • Catheterization in 2020 with patent LIMA-LAD, and mild disease of SVG-OM. Native left circumflex with diffuse disease, RCA with chronic total occlusion however collateral flow  • Continue home aspirin, beta-blocker, statin, ARB        Pleural effusion, bilateral  Assessment & Plan  • Patient with moderate right and small left pleural effusions, in the setting of interstitial edema, most likely secondary to CHF  • Monitor with diuresis  • No current respiratory distress or indication for urgent thoracentesis        Uninsured  Assessment & Plan  Case management consultation: Patient's [facility] cardiologist is coordinating with [facility] population health department to assist patient with obtaining insurance as he currently does not have a Social Security card, or access to insurance     Nonrheumatic aortic valve insufficiency  Assessment & Plan  • Patient with a history of moderate aortic stenosis on prior echocardiograms  • Repeat echocardiogram pending     CKD (chronic kidney disease), stage III [facility])  Assessment & Plan        Lab Results   Component Value Date     EGFR 51 08/02/2023     EGFR 48 08/01/2023     CREATININE 1.40 (H) 08/02/2023     CREATININE 1.47 (H) 08/01/2023      • Baseline Cr: 1.0-1.4  • Creatinine currently at his baseline: Monitor with diuresis  • Monitor closely after patient received IV contrast with CAT scan in the ER        Consultation Note - Cardiology         Assessment/ Plan:  • Chronic combined systolic and diastolic heart failure  ?  LVEF 25%, mild LA dilatation, diastolic dysfunction with elevated LAP, RWMAs c/w CAD, mildly reduced RV function, severe LA dilatation, moderate to severe AS, severe MR, moderate TR/WY with PASP 64 mmHg, August 2023  • Precordial chest pain  • Persistent atrial flutter on Eliquis  • CAD  ? s/p CABG w/ LIMA-LAD and SVG-OM, 2008  ? s/p cath w/ patent LIMA-LAD and mild disease of SVG-OM, diffuse m-dLCx disease and -RCA w/ collaterals from septal perforators, 2020  • Ischemic cardiomyopathy  • Moderate to severe aortic stenosis  ? w/ Vmax 2.98 m/s, mean PG 21 mmHg and ROSEMARIE 1.05 cm2, August 2023  • Severe mitral regurgitation  • CKD  • Dyslipidemia     PLAN:  Patient comes in with some shortness of breath reportedly greater than baseline and chronic dyspnea on exertion; not requiring O2  Chest x-ray clear  He is not been compliant with losartan due to reported cough  Episode of chest discomfort was with radiation from the abdomen that appeared nonanginal  Cardiac enzymes negative over multiple sets; doubt ACS  Not been taking anticoagulation due to lack of insurance; continue Eliquis for now  Social work to evaluate the feasibility of anticoagulation with either Eliquis or warfarin  Continue Toprol-XL 25 mg daily; may consider the initiation of digoxin for rate control and inotropic support tomorrow  Heart rates stable in atrial flutter  BP borderline in the 90s to 100s making it difficult to add ACE inhibitor/ARB; would not add ACE inhibitor with patient experiencing cough on losartan, but if BP improves may consider use of Diovan  Will likely be unable to obtain prescription for Jardiance as it is not generic with significant cost; recommend social work look at the cost for this patient regardless  If patient on anticoagulation at discharge, reasonable for discontinuation of aspirin therapy  Transition oral diuretic tomorrow; does not appear to be significantly overloaded  Outpatient follow-up with primary cardiologist at [address] [place] [address] Results after admission  Pertinent Lab tests (dates/results):  Culture results (blood, urine, spinal, wound, respiratory, etc.):  Imaging tests (dates/results):  EKG (dates/results): Other test (dates/results):  Tests pending (dates/results):    Surgical or Invasive Procedures  Name of surgery/procedure:  Date & Time:  Patient Response:  Post-operative orders:  Operative Report/Findings:    Response to Treatment, Major Change in Condition, Major Charge in Treatment anytime during admission    Disposition on Discharge  Home, Rehab, SNF, LTC, Shelter, etc.: Home/Self Care    Cease to Breathe (CTB)  If a patient expires during an admission, in addition to the above information, please include:    Summary/timeline of the patient's decline in condition:    Medications and treatment:    Patient response to treatment:    Date and time patient ceased to breathe:        Is there a Readmission that follows this admission? Yes X No    If yes, reason for denial:          InterQual Review    InterQual Criteria Met: X Yes  No  N/A        Please include the InterQual Review, InterQual year/version used, and the criteria selected:    InterQual® Review Status: Completed  Review Type: Admission  Criteria Status: Acute Met  Day of review: Episode Day 1  Condition Specific: Yes        REVIEW DETAILS     Product: [employer]val[employer] Justice Adult  Subset: Heart Failure            [X] Select Day, One:          [X] Episode Day 1, One:              [X] ACUTE, One:                  [X] Heart failure, acute on chronic and, All:                      [X] Continued symptoms after at least 1 dose of diuretic and >= 2h treatment and, >= One:                          [X] O2 sat < 89%(0.89) and < baseline                      [X] Finding, >= One:                          [X] Clinical risk factor, >= One:                              [X] Heart rate 100-120/min, sustained                      [X] Intervention, >= One:                          [X] Diuretic, >= One:                              [X] Diuretic >= 2 doses                              [X] Escalation or change of diuretic        Version: RegenQual® 2023, Mar. 2023 Release        PLEASE SUBMIT THE COMPLETED FORM TO THE DEPARTMENT OF HUMAN SERVICES - DIVISION OF CLINICAL  REVIEW VIA FAX -[phone] or VIA E-MAIL TO [email or web address]    Signature: Nydia Mckinnonak Date:  08/07/23    Confidentiality Notice: The documents accompanying this telecopy may contain confidential information belonging to the sender. The information is intended only for the use of the individual named above. If you are not the intended recipient, you are hereby notified  That any disclosure, copying, distribution or taking of any telecopy is strictly prohibited.

## 2023-08-08 ENCOUNTER — PATIENT OUTREACH (OUTPATIENT)
Dept: CASE MANAGEMENT | Facility: HOSPITAL | Age: 68
End: 2023-08-08

## 2023-08-08 NOTE — PROGRESS NOTES
Heart Failure Outpatient Care Coordinator Note: Call made to patient home, spoke briefly to him and then he requested I speak to his Dasia Kapoorpshire who "tales care of all medical questions". Explained role of Heart Failure Outpatient Care Manager to Frannie Montemayor and she was receptive and agreeable to outreach and education. She states someone from SW department was going to discuss process of him having Medicaid or not and getting his card for the pharmacy, but "never came". She adds that patient did get 30 days of his medication at no charge from [address] before he left. It has been a hardship for the them to afford his medication and some he has not been able to get. Self-management/education and teach back:  Primary learner: His s.o. Frannie Montemayor (states they have been together for >20 years)  Following low sodium diet: Frannie Montemayor aware and makes home meals and avoids high sodium/processed foods. Following fluid restriction: Explained no ore than 64 oz, but at least 50 oz. Hospital discharge weight: 148.4#  Weighing daily:  Yes  Recording: encouraged   Weight today: Around 146#  Monitoring symptoms: yes  Any current symptoms: denies SOB or edema, just fatigue. Encouraged walking with breaks  Knows when to call provider:reinforced   Medication reviewed and taking all as prescribed: Yes  Knows name of diuretic:Yes  Escalation plan: Call the cardiologist    Care Coordination:  Aware of cardiology follow up appointment: Yes, states patient is going to stay with his [facility] cardiologist and has appointment for 8/15  Aware of PCP follow up appointment: Needs to establish and will now that patient has Medicaid  Transportation: Frannie Montemayor drives  Social Support: S.OTaya Montemayor  Insurance/financial concerns: Has had trouble affording medication in past  Home health care: declines  Health literacy: Frannie Montemayor states patient has literacy issues.  [facility] is good  Engagement:Very good  Additional comments: Spoke with [facility], [facility] and Frannie Montemayor can use patient's MA Subscriber # for now to give to pharmacy to run as insurance until receives a card. Contact number given to [facility] and will follow up.

## 2023-08-24 ENCOUNTER — PATIENT OUTREACH (OUTPATIENT)
Dept: CASE MANAGEMENT | Facility: HOSPITAL | Age: 68
End: 2023-08-24

## 2023-08-24 NOTE — PROGRESS NOTES
Heart Failure Outpatient Care Coordinator Note: Chart notes reviewed and call made to patient's s.tank Perez and left message. Patient did see his cardiologist 8/15 and had increased MONTES and abdominal bloating and lasix increased to 40 mg bid. Losartan was restarted. He is to see cardiology NP 4 weeks. He had reported adherence to diet and FR. Will follow up.

## 2023-08-31 ENCOUNTER — PATIENT OUTREACH (OUTPATIENT)
Dept: CASE MANAGEMENT | Facility: HOSPITAL | Age: 68
End: 2023-08-31

## 2023-08-31 NOTE — PROGRESS NOTES
Heart Failure Outpatient Care Coordinator Note: Chart notes reviewed and call made to patient's s.o., Domenica Salinas. She reports patient "doing much better' since furosemide increased to 40 mg bid. He is less SOB, more active, and coughing and swelling are gone. His weight is stable and down a few pounds to 146#. She states he weighs daily and is adherent to medication and low sodium diet. She states he will be losing is temporary medicaid end of this month/today. He is not a citizen and does not have access to paperwork to proceed. Has been able to afford medications in past. Denies and care needs at present. Will follow up.